# Patient Record
Sex: MALE | Race: WHITE | Employment: OTHER | URBAN - METROPOLITAN AREA
[De-identification: names, ages, dates, MRNs, and addresses within clinical notes are randomized per-mention and may not be internally consistent; named-entity substitution may affect disease eponyms.]

---

## 2017-01-09 ENCOUNTER — HOSPITAL ENCOUNTER (OUTPATIENT)
Dept: CT IMAGING | Facility: HOSPITAL | Age: 62
Discharge: HOME | End: 2017-01-09
Attending: INTERNAL MEDICINE

## 2017-01-09 DIAGNOSIS — Z13.9 SCREENING: ICD-10-CM

## 2017-03-10 ENCOUNTER — TELEPHONE (OUTPATIENT)
Dept: INTERNAL MEDICINE CLINIC | Facility: CLINIC | Age: 62
End: 2017-03-10

## 2017-03-10 NOTE — TELEPHONE ENCOUNTER
Please call patient. We received a copy of his rapid heart scan and the calcium score puts him in the moderate risk zone for developing heart disease. He may benefit from starting a cholesterol medication.  He can make an appt with me to discuss this furthe

## 2017-03-10 NOTE — TELEPHONE ENCOUNTER
Patient notified AMS received a copy of [facility] which puts pt at a moderate risk for developing heart disease. Scheduled appt for pt on 3/31/17 at 9am to discuss possibly starting a lowering cholesterol medication with AMS. Pt verbalizes understanding.  KEEGAN

## 2017-03-28 ENCOUNTER — APPOINTMENT (OUTPATIENT)
Dept: CT IMAGING | Facility: HOSPITAL | Age: 62
End: 2017-03-28
Attending: EMERGENCY MEDICINE
Payer: COMMERCIAL

## 2017-03-28 ENCOUNTER — HOSPITAL ENCOUNTER (EMERGENCY)
Facility: HOSPITAL | Age: 62
Discharge: HOME | End: 2017-03-28
Attending: EMERGENCY MEDICINE
Payer: COMMERCIAL

## 2017-03-28 ENCOUNTER — APPOINTMENT (OUTPATIENT)
Dept: GENERAL RADIOLOGY | Facility: HOSPITAL | Age: 62
End: 2017-03-28
Attending: EMERGENCY MEDICINE
Payer: COMMERCIAL

## 2017-03-28 VITALS
OXYGEN SATURATION: 95 % | SYSTOLIC BLOOD PRESSURE: 137 MMHG | WEIGHT: 155 LBS | HEART RATE: 54 BPM | DIASTOLIC BLOOD PRESSURE: 85 MMHG | RESPIRATION RATE: 14 BRPM | BODY MASS INDEX: 24.91 KG/M2 | TEMPERATURE: 98 F | HEIGHT: 66 IN

## 2017-03-28 DIAGNOSIS — H53.8 BLURRED VISION: Primary | ICD-10-CM

## 2017-03-28 DIAGNOSIS — F41.9 ANXIETY: ICD-10-CM

## 2017-03-28 LAB
ALBUMIN SERPL-MCNC: 4.4 G/DL (ref 3.5–4.8)
ALP LIVER SERPL-CCNC: 84 U/L (ref 45–117)
ALT SERPL-CCNC: 24 U/L (ref 17–63)
AST SERPL-CCNC: 28 U/L (ref 15–41)
ATRIAL RATE: 54 BPM
BASOPHILS # BLD AUTO: 0.03 X10(3) UL (ref 0–0.1)
BASOPHILS NFR BLD AUTO: 0.6 %
BILIRUB SERPL-MCNC: 0.4 MG/DL (ref 0.1–2)
BUN BLD-MCNC: 23 MG/DL (ref 8–20)
CALCIUM BLD-MCNC: 8.7 MG/DL (ref 8.3–10.3)
CHLORIDE: 105 MMOL/L (ref 101–111)
CO2: 26 MMOL/L (ref 22–32)
CREAT BLD-MCNC: 1.19 MG/DL (ref 0.7–1.3)
EOSINOPHIL # BLD AUTO: 0.05 X10(3) UL (ref 0–0.3)
EOSINOPHIL NFR BLD AUTO: 1 %
ERYTHROCYTE [DISTWIDTH] IN BLOOD BY AUTOMATED COUNT: 13.2 % (ref 11.5–16)
GLUCOSE BLD-MCNC: 80 MG/DL (ref 65–99)
GLUCOSE BLD-MCNC: 89 MG/DL (ref 70–99)
HCT VFR BLD AUTO: 41.9 % (ref 37–53)
HGB BLD-MCNC: 14.1 G/DL (ref 13–17)
IMMATURE GRANULOCYTE COUNT: 0.01 X10(3) UL (ref 0–1)
IMMATURE GRANULOCYTE RATIO %: 0.2 %
LYMPHOCYTES # BLD AUTO: 1.23 X10(3) UL (ref 0.9–4)
LYMPHOCYTES NFR BLD AUTO: 24.7 %
M PROTEIN MFR SERPL ELPH: 7.1 G/DL (ref 6.1–8.3)
MCH RBC QN AUTO: 31 PG (ref 27–33.2)
MCHC RBC AUTO-ENTMCNC: 33.7 G/DL (ref 31–37)
MCV RBC AUTO: 92.1 FL (ref 80–99)
MONOCYTES # BLD AUTO: 0.39 X10(3) UL (ref 0.1–0.6)
MONOCYTES NFR BLD AUTO: 7.8 %
NEUTROPHIL ABS PRELIM: 3.26 X10 (3) UL (ref 1.3–6.7)
NEUTROPHILS # BLD AUTO: 3.26 X10(3) UL (ref 1.3–6.7)
NEUTROPHILS NFR BLD AUTO: 65.7 %
P AXIS: 45 DEGREES
P-R INTERVAL: 182 MS
PLATELET # BLD AUTO: 184 10(3)UL (ref 150–450)
POTASSIUM SERPL-SCNC: 4 MMOL/L (ref 3.6–5.1)
Q-T INTERVAL: 430 MS
QRS DURATION: 94 MS
QTC CALCULATION (BEZET): 407 MS
R AXIS: -19 DEGREES
RBC # BLD AUTO: 4.55 X10(6)UL (ref 4.3–5.7)
RED CELL DISTRIBUTION WIDTH-SD: 44.5 FL (ref 35.1–46.3)
SODIUM SERPL-SCNC: 138 MMOL/L (ref 136–144)
T AXIS: -3 DEGREES
TROPONIN: <0.046 NG/ML (ref ?–0.05)
VENTRICULAR RATE: 54 BPM
WBC # BLD AUTO: 5 X10(3) UL (ref 4–13)

## 2017-03-28 PROCEDURE — 70450 CT HEAD/BRAIN W/O DYE: CPT

## 2017-03-28 PROCEDURE — 93010 ELECTROCARDIOGRAM REPORT: CPT

## 2017-03-28 PROCEDURE — 80053 COMPREHEN METABOLIC PANEL: CPT | Performed by: EMERGENCY MEDICINE

## 2017-03-28 PROCEDURE — 93005 ELECTROCARDIOGRAM TRACING: CPT

## 2017-03-28 PROCEDURE — 82962 GLUCOSE BLOOD TEST: CPT

## 2017-03-28 PROCEDURE — 84484 ASSAY OF TROPONIN QUANT: CPT | Performed by: EMERGENCY MEDICINE

## 2017-03-28 PROCEDURE — 99284 EMERGENCY DEPT VISIT MOD MDM: CPT

## 2017-03-28 PROCEDURE — 99285 EMERGENCY DEPT VISIT HI MDM: CPT

## 2017-03-28 PROCEDURE — 36415 COLL VENOUS BLD VENIPUNCTURE: CPT

## 2017-03-28 PROCEDURE — 85025 COMPLETE CBC W/AUTO DIFF WBC: CPT | Performed by: EMERGENCY MEDICINE

## 2017-03-28 PROCEDURE — 71010 XR CHEST AP PORTABLE  (CPT=71010): CPT

## 2017-03-28 NOTE — ED INITIAL ASSESSMENT (HPI)
Reports blurred vision for 5-10 minutes today, nauseated at that time. States all symptoms are improved now.  Denies dizziness

## 2017-03-28 NOTE — ED PROVIDER NOTES
Patient Seen in: [facility] Emergency Department    History   Patient presents with:   Eye Visual Problem (opthalmic)    Stated Complaint: blurred vision, nausea    HPI    71-year-old male that was brought to the emergency room after having about a 1 Packs/Day: 0.00  Years:           Types: Cigars    Comment: social smoker (cigars)     Alcohol Use: Yes           0.0 oz/week       0 Standard drinks or equivalent per week       Comment: a few glasses of wine a day       Review of Systems    Positive for DIFFERENTIAL[[id number]]                              Final result                 Please view results for these tests on the individual orders. CBC W/ DIFFERENTIAL      EKG    Rate, intervals and axes as noted on EKG Report.   Rate: 54  Rhythm: Sinus Rhyt PROCEDURE:  XR CHEST AP PORTABLE (CPT=71010)     TECHNIQUE:  AP chest radiograph was obtained.     COMPARISON:  [facility] , CHEST PA   LATERAL, 5/27/2011, 15:31.     INDICATIONS:  blurred vision, nausea     PATIENT STATED HISTORY: (As transcribed by Paolaa-Cola

## 2017-03-31 ENCOUNTER — OFFICE VISIT (OUTPATIENT)
Dept: INTERNAL MEDICINE CLINIC | Facility: CLINIC | Age: 62
End: 2017-03-31

## 2017-03-31 VITALS
RESPIRATION RATE: 16 BRPM | SYSTOLIC BLOOD PRESSURE: 106 MMHG | BODY MASS INDEX: 28.03 KG/M2 | HEART RATE: 60 BPM | HEIGHT: 64.5 IN | WEIGHT: 166.19 LBS | TEMPERATURE: 98 F | DIASTOLIC BLOOD PRESSURE: 68 MMHG

## 2017-03-31 DIAGNOSIS — Z82.49 FAMILY HISTORY OF HEART DISEASE: ICD-10-CM

## 2017-03-31 DIAGNOSIS — R93.1 ELEVATED CORONARY ARTERY CALCIUM SCORE: Primary | ICD-10-CM

## 2017-03-31 PROCEDURE — 99213 OFFICE O/P EST LOW 20 MIN: CPT | Performed by: FAMILY MEDICINE

## 2017-03-31 RX ORDER — PRAVASTATIN SODIUM 20 MG
20 TABLET ORAL NIGHTLY
Qty: 90 TABLET | Refills: 0 | Status: SHIPPED | OUTPATIENT
Start: 2017-03-31 | End: 2017-05-01

## 2017-03-31 NOTE — PROGRESS NOTES
HPI:    Patient ID: Karen Moreno is a 57year old male. HPI Here for f/u on CT calcium score. Put patient in moderate risk for heart disease. Has strong family history of heart disease. Rarely smokes cigars. Exercises regularly and takes baby ASA.  Shelley Fontanacal

## 2017-03-31 NOTE — PATIENT INSTRUCTIONS
Understanding Coronary Artery Disease (CAD)    To understand coronary artery disease (CAD), you need to know how your heart works. Your heart is a muscle that pumps blood throughout your body. To work right, your heart needs a steady supply of oxygen.  It © 7084-2954 [address], [address] [place]. All rights reserved. This information is not intended as a substitute for professional medical care. Always follow your healthcare professional's instructions.

## 2017-05-01 RX ORDER — PRAVASTATIN SODIUM 20 MG
20 TABLET ORAL NIGHTLY
Qty: 90 TABLET | Refills: 0 | Status: SHIPPED | OUTPATIENT
Start: 2017-05-01 | End: 2017-09-13

## 2017-05-01 NOTE — TELEPHONE ENCOUNTER
Last Office Visit: 3-31-17 with AS for follow up   Last Rx Filled: 3-31-17 90 tabs with no refills   Last Labs: 3-28-17 cbc/cmp  Future Appointment: none     Per protocol to provider

## 2017-07-17 ENCOUNTER — MED REC SCAN ONLY (OUTPATIENT)
Dept: INTERNAL MEDICINE CLINIC | Facility: CLINIC | Age: 62
End: 2017-07-17

## 2017-07-18 NOTE — TELEPHONE ENCOUNTER
Please call patient. We received a copy of his labs done through his wellness program for work. Everything looks fine.

## 2017-07-19 RX ORDER — ALPRAZOLAM 0.5 MG/1
0.5 TABLET ORAL 3 TIMES DAILY PRN
Qty: 10 TABLET | Refills: 0 | Status: SHIPPED | OUTPATIENT
Start: 2017-07-19 | End: 2018-07-10

## 2017-07-19 NOTE — TELEPHONE ENCOUNTER
Called pt to advise info per AMS that labs are WNL and look fine. Pt verbalized understanding. Pt also wants to notify AMS that he will be traveling and would like for some alprazolam [place]) 0.5 MG Oral Tab to be sent to pharmacy listed.  Pt states that h

## 2017-07-21 NOTE — TELEPHONE ENCOUNTER
Per AMS request- Rx was called into [facility] pharmacy s/w Larissa Bedoya. Pt was notified nothing further.

## 2017-09-13 RX ORDER — PRAVASTATIN SODIUM 20 MG
20 TABLET ORAL NIGHTLY
Qty: 90 TABLET | Refills: 1 | Status: SHIPPED | OUTPATIENT
Start: 2017-09-13 | End: 2018-05-10

## 2017-09-13 NOTE — TELEPHONE ENCOUNTER
Received fax from [employer] [facility] for refill of pravastatin  Medication(s) to Refill:   Pending Prescriptions Disp Refills    Pravastatin Sodium 20 MG Oral Tab 90 tablet 0     Sig: Take 1 tablet (20 mg total) by mouth nightly.            Last Time M 03/28/2017         Lab Results  Component Value Date   CHOLEST 210 (H) 11/01/2016   TRIG 92 11/01/2016   HDL 83 11/01/2016    11/01/2016   VLDL 18 11/01/2016   TCHDLRATIO 2.53 11/01/2016   [place] 127 11/01/2016

## 2017-11-14 ENCOUNTER — OFFICE VISIT (OUTPATIENT)
Dept: INTERNAL MEDICINE CLINIC | Facility: CLINIC | Age: 62
End: 2017-11-14

## 2017-11-14 VITALS
DIASTOLIC BLOOD PRESSURE: 60 MMHG | RESPIRATION RATE: 16 BRPM | WEIGHT: 162 LBS | SYSTOLIC BLOOD PRESSURE: 110 MMHG | TEMPERATURE: 98 F | BODY MASS INDEX: 26.03 KG/M2 | HEIGHT: 66 IN | HEART RATE: 68 BPM

## 2017-11-14 DIAGNOSIS — Z00.00 ANNUAL PHYSICAL EXAM: Primary | ICD-10-CM

## 2017-11-14 DIAGNOSIS — E78.5 HYPERLIPIDEMIA, UNSPECIFIED HYPERLIPIDEMIA TYPE: ICD-10-CM

## 2017-11-14 PROCEDURE — 99396 PREV VISIT EST AGE 40-64: CPT | Performed by: FAMILY MEDICINE

## 2017-11-14 PROCEDURE — 90471 IMMUNIZATION ADMIN: CPT | Performed by: FAMILY MEDICINE

## 2017-11-14 PROCEDURE — 90686 IIV4 VACC NO PRSV 0.5 ML IM: CPT | Performed by: FAMILY MEDICINE

## 2017-11-14 NOTE — PROGRESS NOTES
HPI:    Patient ID: Luci Dockery is a 60year old male. HPI Here for annual check-up. Patient has no complaints. Continues to exercise regularly. Notes he does probably eat too many carbohydrates. Up to date on colonoscopy.  Plans to retire in the ne swelling. Gastrointestinal: Negative for abdominal pain, constipation, diarrhea, nausea and vomiting. Endocrine: Negative for polydipsia, polyphagia and polyuria. Genitourinary: Negative for dysuria.    Musculoskeletal: Negative for arthralgias and angelique with patient. Reviewed lab results from June. Encouraged regular exercise and healthy eating. 2. Controlled on med. Continue.        Orders Placed This Encounter      Flulaval 0.5 ml >= 6 months Quad single dose Pres Free (71726)    Meds This Visit:  No p

## 2017-11-14 NOTE — PATIENT INSTRUCTIONS
Prevention Guidelines, Men Ages 48 to 59  Screening tests and vaccines are an important part of managing your health. Health counseling is essential, too. Below are guidelines for these, for men ages 48 to 59.  Talk with your healthcare provider to make s Prostate cancer Starting at age 39, talk to healthcare provider about risks and benefits of digital rectal exam (GO) and prostate-specific antigen (PSA) screening1 At routine exams   Syphilis Men at increased risk for infection – talk with your healthcare pertussis (Td/Tdap) booster All men in this age group Td every 10 years, or a one-time dose of Tdap instead of a Td booster after age 25, then Td every 8 years   Zoster All men ages 61 and older 1 dose   Counseling Who needs it How often   Diet and exerci

## 2018-05-10 RX ORDER — PRAVASTATIN SODIUM 20 MG
TABLET ORAL
Qty: 90 TABLET | Refills: 1 | Status: SHIPPED | OUTPATIENT
Start: 2018-05-10 | End: 2018-12-11

## 2018-05-10 NOTE — TELEPHONE ENCOUNTER
Requesting : pravastatin 20mg 1 QD  LOV: 11/14/17, AMS annual physical discussed cholesterol  RTC: 1 year for annual physical  Last Relevant Labs: 6/20/17 lipid panel (scanned in) - WNL  Filled: 9/13/17 #90 with 1 refills to mail order pharm    No future a

## 2018-07-10 ENCOUNTER — TELEPHONE (OUTPATIENT)
Dept: INTERNAL MEDICINE CLINIC | Facility: CLINIC | Age: 63
End: 2018-07-10

## 2018-07-10 RX ORDER — ALPRAZOLAM 0.5 MG/1
0.5 TABLET ORAL 3 TIMES DAILY PRN
Qty: 10 TABLET | Refills: 0 | Status: SHIPPED | OUTPATIENT
Start: 2018-07-10 | End: 2019-06-29

## 2018-07-10 NOTE — TELEPHONE ENCOUNTER
Rx printed. I will need to sign so you can fax. Please call patient to let him know once you fax it.

## 2018-07-10 NOTE — TELEPHONE ENCOUNTER
Pt will be traveling on 7/21/18 and would like to know if AMS will order   ALPRAZolam 0.5 MG Oral Tab 10 tablet 0 7/19/2017     Sig - Route:  Take 1 tablet (0.5 mg total) by mouth 3 (three) times daily as needed for Anxiety. - Oral      Please advise     Se

## 2018-07-10 NOTE — TELEPHONE ENCOUNTER
LOV: 11/14/17 AMS  Future office visit: 11/19/18 AMS  Last labs: 6/20/17 Cmp Cbc Lipid -Scanned in   Last RX: Alprazolam 7/1917 #10 No Refills  Per protocol: Route to provider       Please advise on telephone encounter w/ refill.

## 2018-09-21 ENCOUNTER — TELEPHONE (OUTPATIENT)
Dept: INTERNAL MEDICINE CLINIC | Facility: CLINIC | Age: 63
End: 2018-09-21

## 2018-09-21 DIAGNOSIS — R74.8 ELEVATED LIVER ENZYMES: Primary | ICD-10-CM

## 2018-09-21 NOTE — TELEPHONE ENCOUNTER
Please call patient. We received his lab results done through his employer. Everything looks good except a couple of his liver tests are off. Not drastic but just something we need to repeat in about 3 months. Non-fasting order is in.  He has physical with

## 2018-09-24 ENCOUNTER — TELEPHONE (OUTPATIENT)
Dept: INTERNAL MEDICINE CLINIC | Facility: CLINIC | Age: 63
End: 2018-09-24

## 2018-09-24 NOTE — TELEPHONE ENCOUNTER
Called pt back. Spoke to pt, reviewed results and POC per AMS. Pt verbalized understanding.  Advised that labs have been ordered and that he does not need to fast.  Also recommended pt have labs done prior to upcoming CPE with AMS so those results can be d

## 2018-11-02 ENCOUNTER — APPOINTMENT (OUTPATIENT)
Dept: LAB | Facility: HOSPITAL | Age: 63
End: 2018-11-02
Attending: FAMILY MEDICINE
Payer: COMMERCIAL

## 2018-11-02 DIAGNOSIS — R74.8 ELEVATED LIVER ENZYMES: ICD-10-CM

## 2018-11-02 PROCEDURE — 36415 COLL VENOUS BLD VENIPUNCTURE: CPT

## 2018-11-02 PROCEDURE — 80053 COMPREHEN METABOLIC PANEL: CPT

## 2018-11-19 ENCOUNTER — OFFICE VISIT (OUTPATIENT)
Dept: INTERNAL MEDICINE CLINIC | Facility: CLINIC | Age: 63
End: 2018-11-19
Payer: COMMERCIAL

## 2018-11-19 VITALS
HEART RATE: 70 BPM | BODY MASS INDEX: 26 KG/M2 | TEMPERATURE: 98 F | WEIGHT: 164 LBS | DIASTOLIC BLOOD PRESSURE: 72 MMHG | SYSTOLIC BLOOD PRESSURE: 120 MMHG | RESPIRATION RATE: 16 BRPM

## 2018-11-19 DIAGNOSIS — G89.29 CHRONIC LEFT SHOULDER PAIN: ICD-10-CM

## 2018-11-19 DIAGNOSIS — M25.512 CHRONIC LEFT SHOULDER PAIN: ICD-10-CM

## 2018-11-19 DIAGNOSIS — Z11.59 NEED FOR HEPATITIS C SCREENING TEST: ICD-10-CM

## 2018-11-19 DIAGNOSIS — Z00.00 ANNUAL PHYSICAL EXAM: Primary | ICD-10-CM

## 2018-11-19 DIAGNOSIS — E78.00 PURE HYPERCHOLESTEROLEMIA: ICD-10-CM

## 2018-11-19 PROCEDURE — 99396 PREV VISIT EST AGE 40-64: CPT | Performed by: FAMILY MEDICINE

## 2018-11-21 NOTE — PROGRESS NOTES
HPI:    Patient ID: Merline Busman is a 58year old male. HPI Here for annual check-up. Patient c/o chronic left shoulder pain. Has had right shoulder surgery a few years ago and was told he likely will need left shoulder. Normal ROM. No clear injury. Problem Relation Age of Onset   • Heart Attack Father         MI   • Heart Disease Father    • Hypertension Paternal Grandfather    • Heart Disease Paternal Grandfather    • Cancer Paternal Grandmother         Pancreas   • Cancer Maternal Grandfather moist and mucous membranes are normal. No posterior oropharyngeal erythema. Eyes: Conjunctivae are normal.   Neck: Normal range of motion. Neck supple. Cardiovascular: Normal rate, regular rhythm and normal heart sounds.    Pulmonary/Chest: Effort shantel

## 2018-11-30 ENCOUNTER — APPOINTMENT (OUTPATIENT)
Dept: LAB | Facility: HOSPITAL | Age: 63
End: 2018-11-30
Attending: FAMILY MEDICINE
Payer: COMMERCIAL

## 2018-11-30 DIAGNOSIS — Z11.59 NEED FOR HEPATITIS C SCREENING TEST: ICD-10-CM

## 2018-11-30 PROCEDURE — 86803 HEPATITIS C AB TEST: CPT

## 2018-11-30 PROCEDURE — 36415 COLL VENOUS BLD VENIPUNCTURE: CPT

## 2018-12-11 RX ORDER — PRAVASTATIN SODIUM 20 MG
TABLET ORAL
Qty: 90 TABLET | Refills: 2 | Status: SHIPPED | OUTPATIENT
Start: 2018-12-11 | End: 2019-10-03

## 2018-12-11 NOTE — TELEPHONE ENCOUNTER
E request  LOV: 11/19/18- annual px  Last labs: 11/30/18- no lipid  Last rx: 5/10/18- 90 tablets with 1 refill  Per protocol to provider

## 2019-02-08 ENCOUNTER — TELEPHONE (OUTPATIENT)
Dept: INTERNAL MEDICINE CLINIC | Facility: CLINIC | Age: 64
End: 2019-02-08

## 2019-02-08 NOTE — TELEPHONE ENCOUNTER
Spoke with patient. Patient was given information below from AMS and Pre-op scheduled.     Future Appointments   Date Time Provider Elisha Montoya   2/15/2019  1:00 PM Mode Ruth DO [facility]

## 2019-02-08 NOTE — TELEPHONE ENCOUNTER
Please call patient to schedule pre-op appt. The paperwork we received states that they informed the patient he will need to call their pre-admissions testing dept ([phone]) to arrange for any pre-op testing needed. He needs to do this and have any testing needed done prior to seeing me.

## 2019-02-15 ENCOUNTER — OFFICE VISIT (OUTPATIENT)
Dept: INTERNAL MEDICINE CLINIC | Facility: CLINIC | Age: 64
End: 2019-02-15
Payer: COMMERCIAL

## 2019-02-15 ENCOUNTER — TELEPHONE (OUTPATIENT)
Dept: INTERNAL MEDICINE CLINIC | Facility: CLINIC | Age: 64
End: 2019-02-15

## 2019-02-15 VITALS
TEMPERATURE: 98 F | OXYGEN SATURATION: 99 % | DIASTOLIC BLOOD PRESSURE: 78 MMHG | WEIGHT: 162 LBS | HEART RATE: 59 BPM | SYSTOLIC BLOOD PRESSURE: 124 MMHG | HEIGHT: 64.25 IN | BODY MASS INDEX: 27.66 KG/M2

## 2019-02-15 DIAGNOSIS — Z01.818 PREOPERATIVE EXAMINATION: Primary | ICD-10-CM

## 2019-02-15 DIAGNOSIS — M75.102 TEAR OF LEFT ROTATOR CUFF, UNSPECIFIED TEAR EXTENT: ICD-10-CM

## 2019-02-15 PROBLEM — M75.122 COMPLETE TEAR OF LEFT ROTATOR CUFF: Status: ACTIVE | Noted: 2019-02-05

## 2019-02-15 PROBLEM — M19.019 OSTEOARTHRITIS OF ACROMIOCLAVICULAR JOINT: Status: ACTIVE | Noted: 2019-02-05

## 2019-02-15 PROBLEM — M75.42 ROTATOR CUFF IMPINGEMENT SYNDROME OF LEFT SHOULDER: Status: ACTIVE | Noted: 2018-12-17

## 2019-02-15 PROCEDURE — 99214 OFFICE O/P EST MOD 30 MIN: CPT | Performed by: FAMILY MEDICINE

## 2019-02-15 NOTE — PROGRESS NOTES
HPI:    Patient ID: Anthony Song is a 58year old male.     HPI Here for preoperative exam for planned left shoulder arthroscopic rotator cuff repair, subacromial decompression, possible distal clavicle excision and debridement with Dr. Shanice Ruiz schedule diarrhea, nausea and vomiting. Endocrine: Negative for polydipsia, polyphagia and polyuria. Genitourinary: Negative for dysuria. Musculoskeletal: Negative for arthralgias and joint swelling. Skin: Negative for rash.    Neurological: Negative for diz orders of the defined types were placed in this encounter.       Meds This Visit:  Requested Prescriptions      No prescriptions requested or ordered in this encounter       Imaging & Referrals:  None       #[phone]

## 2019-02-17 ENCOUNTER — PATIENT MESSAGE (OUTPATIENT)
Dept: INTERNAL MEDICINE CLINIC | Facility: CLINIC | Age: 64
End: 2019-02-17

## 2019-02-18 NOTE — TELEPHONE ENCOUNTER
From: Katie Schwartz  To: Levon Rossi DO  Sent: 2/17/2019 7:23 PM CST  Subject: Prescription Question    Can I take Alprazolam .5 MG on a \"as needed only \" basis prior to my 2/27 shoulder surgery? Flying/ traveling next week. Thank you.  Ric Myles

## 2019-02-18 NOTE — TELEPHONE ENCOUNTER
AMS- ok for pt to take 1 pill daily when flying (he will take one pill on going/returning flight) if he has surgery scheduled for 2/27? Please advise, thank you.     ALPRAZolam 0.5 MG Oral Tab Take 1 tablet (0.5 mg total) by mouth 3 (three) times daily as n

## 2019-07-01 NOTE — TELEPHONE ENCOUNTER
Last Office Visit: 2-15-19 for pre op  Last Rx Filled: 7-10-18 10 tabs with no refills   Last Labs: 11-30-18 hcv antibody.  11-2-18 cmp  Future Appointment: none    Per protocol to provider     Please see mychart message from patient

## 2019-07-01 NOTE — TELEPHONE ENCOUNTER
Please phone in Rx and then place. Message patient once you've done this and also note with altitude sickness the best thing is to not keep climbing.  He should make sure to stay well-hydrated to try to prevent this and is he gets symptoms and they do not g

## 2019-07-01 NOTE — TELEPHONE ENCOUNTER
Called pharmacy and provided verbal rx for Alprazolam 0.5 mg tabs; 1 tab daily PRN for anxiety; #15 with 0 refills under AMS. Routed back to AMS to sign. Replied to pt's [employer] message with AMS instructions.

## 2019-07-02 RX ORDER — ALPRAZOLAM 0.5 MG/1
0.5 TABLET ORAL
Qty: 15 TABLET | Refills: 0 | OUTPATIENT
Start: 2019-07-02 | End: 2019-08-31

## 2019-07-02 NOTE — TELEPHONE ENCOUNTER
He's asking for dexamethasone or nifedipine for altitude hiking. Just let him know that these are medications that are used to prevent/treat a specific, very serious form of altitude sickness and not for typical high altitude hikes.  Therefore, not somethin

## 2019-08-13 ENCOUNTER — TELEPHONE (OUTPATIENT)
Dept: INTERNAL MEDICINE CLINIC | Facility: CLINIC | Age: 64
End: 2019-08-13

## 2019-08-13 DIAGNOSIS — Z12.5 ENCOUNTER FOR SCREENING FOR MALIGNANT NEOPLASM OF PROSTATE: Primary | ICD-10-CM

## 2019-08-13 NOTE — TELEPHONE ENCOUNTER
Future Appointments   Date Time Provider Elisha Montoya   11/22/2019  8:30 AM Yenni Stark, DO [facility]     Orders to [facility]- Pt aware to fast-no call back required

## 2019-09-03 ENCOUNTER — PATIENT MESSAGE (OUTPATIENT)
Dept: INTERNAL MEDICINE CLINIC | Facility: CLINIC | Age: 64
End: 2019-09-03

## 2019-09-03 RX ORDER — ALPRAZOLAM 0.5 MG/1
0.5 TABLET ORAL
Qty: 15 TABLET | Refills: 0 | Status: SHIPPED | OUTPATIENT
Start: 2019-09-03 | End: 2020-03-19

## 2019-09-03 RX ORDER — ALPRAZOLAM 0.5 MG/1
0.5 TABLET ORAL
Qty: 15 TABLET | Refills: 0 | OUTPATIENT
Start: 2019-09-03

## 2019-09-03 NOTE — TELEPHONE ENCOUNTER
Pt will be going out of town today at 1:00 for a week and is asking that it be filled before then pls.

## 2019-09-03 NOTE — TELEPHONE ENCOUNTER
Alprazolam 0.5mg was sent to [facility] on [address]    Confirmation received   Closing encounter

## 2019-09-03 NOTE — TELEPHONE ENCOUNTER
Last Office Visit: 2-15-19 for pre op  Last Rx Filled: 7-2-19 15 tabs with no refills   Last Labs: 11-30-18 hcv antibody/cmp  Future Appointment: 11-22-19    Per protocol to provider

## 2019-09-10 LAB
A/G RATIO: 2.5
ALBUMIN: 4.8 G/DL
ALKALINE PHOSPHATASE: 61
ALT (SGPT): 20 IU/L
AMB EXT CHOL/HDL RATIO: 2.1
AMB EXT CHOLESTEROL, TOTAL: 157 MG/DL
AMB EXT HDL CHOLESTEROL: 76 MG/DL
AMB EXT LDL CHOLESTEROL, DIRECT: 69 MG/DL
AMB EXT TRIGLYCERIDES: 62 MG/DL
AMB EXT VLDL: 12 MG/DL
AST (SGOT): 36 IU/L
BILIRUBIN, DIRECT: 0.14 MG/DL
BILIRUBIN, TOTAL: 0.4
BUN/CREATININE RATIO: 22
BUN: 20
CALCIUM: 9.1
CARBON DIOXIDE: 21
CHLORIDE: 103
CREATININE: 0.93 MG/DL
EGFR IF NONAFRICN AM: 86
GGT: 33 IU/L
GLOBULIN, TOTAL: 1.9 G/DL
GLUCOSE: 94
HCT: 42.2
HGB: 14.2
IRON: 90
LDH: 230 IU/L
MEAN CELL VOLUME: 90
MEAN CORPUSCULAR HEMOGLOBIN: 30.3
MEAN CORPUSCULAR HGB CONC: 33.6
PHOSPHORUS: 3
PLT: 216
POTASSIUM: 4.2
RED BLOOD COUNT: 4.68
RED CELL DISTRIBUTION WIDTH: 13.6
SODIUM: 138
TOTAL PROTEIN: 6.7
TSH: 1.5 UIU/ML
URIC ACID: 6.5
WBC: 5.1

## 2019-09-13 ENCOUNTER — TELEPHONE (OUTPATIENT)
Dept: INTERNAL MEDICINE CLINIC | Facility: CLINIC | Age: 64
End: 2019-09-13

## 2019-09-13 NOTE — TELEPHONE ENCOUNTER
Received lab results from [facility] ordered by pt's employer for wellness program.  Abstracted and placed in AMS bin for review. Once reviewed, will send to scanning.

## 2019-09-13 NOTE — TELEPHONE ENCOUNTER
I don't see Lipid panel results listed. If that hasn't been done (I can't recall from when I looked over his labs) he can do lipids. Also if he wants PSA done we can order that.

## 2019-09-13 NOTE — TELEPHONE ENCOUNTER
Pt had labs done through employer on 9/11/19 - Uric acid, CMP, Iron, Lipid panel, TSH, CBC. Will these count towards CPE labs? Please advise, thank you.

## 2019-09-17 NOTE — TELEPHONE ENCOUNTER
Called pt back. Pt does want to have PSA checked, pended to AMS. Pt stated he would come in a few weeks and also get his flu shot at that time.   Pt asked about Shingrix, advised pt to look into his insurance to see if he needs to have done at doctor's of

## 2019-09-26 ENCOUNTER — TELEPHONE (OUTPATIENT)
Dept: INTERNAL MEDICINE CLINIC | Facility: CLINIC | Age: 64
End: 2019-09-26

## 2019-09-26 NOTE — TELEPHONE ENCOUNTER
Future Appointments   Date Time Provider Elisha Montoya   9/27/2019  9:45 AM [facility]   11/22/2019  8:30 AM Maeve Hammond, DO [facility]     Pt coming tomorrow for flu and pneumonia injection-please place order for p

## 2019-09-27 ENCOUNTER — APPOINTMENT (OUTPATIENT)
Dept: LAB | Age: 64
End: 2019-09-27
Attending: FAMILY MEDICINE
Payer: COMMERCIAL

## 2019-09-27 ENCOUNTER — MED REC SCAN ONLY (OUTPATIENT)
Dept: INTERNAL MEDICINE CLINIC | Facility: CLINIC | Age: 64
End: 2019-09-27

## 2019-09-27 ENCOUNTER — NURSE ONLY (OUTPATIENT)
Dept: INTERNAL MEDICINE CLINIC | Facility: CLINIC | Age: 64
End: 2019-09-27
Payer: COMMERCIAL

## 2019-09-27 DIAGNOSIS — Z12.5 ENCOUNTER FOR SCREENING FOR MALIGNANT NEOPLASM OF PROSTATE: ICD-10-CM

## 2019-09-27 LAB — COMPLEXED PSA SERPL-MCNC: 1.67 NG/ML (ref ?–4)

## 2019-09-27 PROCEDURE — 90472 IMMUNIZATION ADMIN EACH ADD: CPT | Performed by: FAMILY MEDICINE

## 2019-09-27 PROCEDURE — 90662 IIV NO PRSV INCREASED AG IM: CPT | Performed by: FAMILY MEDICINE

## 2019-09-27 PROCEDURE — 84153 ASSAY OF PSA TOTAL: CPT | Performed by: FAMILY MEDICINE

## 2019-09-27 PROCEDURE — 90670 PCV13 VACCINE IM: CPT | Performed by: FAMILY MEDICINE

## 2019-09-27 PROCEDURE — 90471 IMMUNIZATION ADMIN: CPT | Performed by: FAMILY MEDICINE

## 2019-10-03 RX ORDER — PRAVASTATIN SODIUM 20 MG
TABLET ORAL
Qty: 90 TABLET | Refills: 0 | Status: SHIPPED | OUTPATIENT
Start: 2019-10-03 | End: 2019-12-23

## 2019-10-03 NOTE — TELEPHONE ENCOUNTER
Last Office Visit 11/19/18    Last CPE 11/19/18    Last refill 12/11/18-Pravastatin Sodium 20mg    Quantity 90 tablet, 2 refills    Last labs that are related  9/10/19-lipid, cmp, cbc    Future appointment   Future Appointments   Date Time Provider Departm

## 2019-11-22 ENCOUNTER — OFFICE VISIT (OUTPATIENT)
Dept: INTERNAL MEDICINE CLINIC | Facility: CLINIC | Age: 64
End: 2019-11-22
Payer: COMMERCIAL

## 2019-11-22 VITALS
WEIGHT: 159 LBS | DIASTOLIC BLOOD PRESSURE: 82 MMHG | SYSTOLIC BLOOD PRESSURE: 132 MMHG | HEART RATE: 56 BPM | HEIGHT: 64.49 IN | BODY MASS INDEX: 26.81 KG/M2 | RESPIRATION RATE: 16 BRPM | TEMPERATURE: 98 F

## 2019-11-22 DIAGNOSIS — E78.00 PURE HYPERCHOLESTEROLEMIA: ICD-10-CM

## 2019-11-22 DIAGNOSIS — E04.1 THYROID NODULE: ICD-10-CM

## 2019-11-22 DIAGNOSIS — Z00.00 ANNUAL PHYSICAL EXAM: Primary | ICD-10-CM

## 2019-11-22 PROBLEM — M75.42 ROTATOR CUFF IMPINGEMENT SYNDROME OF LEFT SHOULDER: Status: RESOLVED | Noted: 2018-12-17 | Resolved: 2019-11-22

## 2019-11-22 PROBLEM — M19.019 OSTEOARTHRITIS OF ACROMIOCLAVICULAR JOINT: Status: RESOLVED | Noted: 2019-02-05 | Resolved: 2019-11-22

## 2019-11-22 PROBLEM — M75.122 COMPLETE TEAR OF LEFT ROTATOR CUFF: Status: RESOLVED | Noted: 2019-02-05 | Resolved: 2019-11-22

## 2019-11-22 PROCEDURE — 99397 PER PM REEVAL EST PAT 65+ YR: CPT | Performed by: FAMILY MEDICINE

## 2019-11-22 NOTE — PROGRESS NOTES
HPI:    Patient ID: Katie Schwartz is a 71year old male. HPI Here for annual check-up. Patient continues to eat healthy and run for exercise. Feels great. Taking meds as prescribed with no issues.  Continues to rarely need Xanax and this helps when joint swelling. Skin: Negative for rash. Neurological: Negative for dizziness, weakness, light-headedness, numbness and headaches. Hematological: Negative for adenopathy. Does not bruise/bleed easily.    Psychiatric/Behavioral: Negative for dysphoric first.     No orders of the defined types were placed in this encounter.       Meds This Visit:  Requested Prescriptions      No prescriptions requested or ordered in this encounter       Imaging & Referrals:  None       PW#[id number]

## 2019-11-22 NOTE — PATIENT INSTRUCTIONS
Prevention Guidelines, Men Ages 72 and Older  Screening tests and vaccines are an important part of managing your health. A screening test is done to find possible disorders or diseases in people who don't have any symptoms.  The goal is to find a disease infection – talk with your healthcare provider At routine exams   High cholesterol or triglycerides All men in this age group At least every 5 years   HIV Men at increased risk for infection – talk with your healthcare provider At routine exams   Lung canc pneumococcal polysaccharide vaccine (PPSV23) All men in this age group 1 dose of each vaccine   Tetanus/diphtheria/  pertussis (Td/Tdap) booster All men in this age group Td every 10 years, or Tdap if you will have contact with a child younger than 13 [address]

## 2019-12-23 RX ORDER — PRAVASTATIN SODIUM 20 MG
TABLET ORAL
Qty: 90 TABLET | Refills: 2 | Status: SHIPPED | OUTPATIENT
Start: 2019-12-23 | End: 2020-03-19

## 2019-12-23 NOTE — TELEPHONE ENCOUNTER
LOV:11/22/19  Last CPE:11/22/19  Last refill:PRAVASTATIN SODIUM 20 MG Oral Tab 10/3/19  Quantity: 90 tablet, 0 refills  Last labs that are related: lipid 9/10/19  Future appointment: none  Protocol: pend    Please approve or deny

## 2020-03-12 ENCOUNTER — OFFICE VISIT (OUTPATIENT)
Dept: INTERNAL MEDICINE CLINIC | Facility: CLINIC | Age: 65
End: 2020-03-12
Payer: MEDICARE

## 2020-03-12 VITALS
TEMPERATURE: 98 F | HEART RATE: 60 BPM | WEIGHT: 159 LBS | SYSTOLIC BLOOD PRESSURE: 138 MMHG | BODY MASS INDEX: 26.81 KG/M2 | RESPIRATION RATE: 16 BRPM | HEIGHT: 64.49 IN | DIASTOLIC BLOOD PRESSURE: 76 MMHG

## 2020-03-12 DIAGNOSIS — R09.82 POST-NASAL DRIP: ICD-10-CM

## 2020-03-12 DIAGNOSIS — R12 HEARTBURN: Primary | ICD-10-CM

## 2020-03-12 DIAGNOSIS — J02.9 ACUTE PHARYNGITIS, UNSPECIFIED ETIOLOGY: ICD-10-CM

## 2020-03-12 PROCEDURE — 99213 OFFICE O/P EST LOW 20 MIN: CPT | Performed by: NURSE PRACTITIONER

## 2020-03-12 NOTE — PROGRESS NOTES
HPI:    Patient ID: Silverio Pedraza is a 71year old male. Patient presents with c/o an occasional non-productive cough for the past 2-3 months with associate throat clearing.   Denies any associated fevers, chills, body aches, shortness of breath, whee Review of Systems   Constitutional: Negative for activity change, appetite change, chills, fatigue, fever and unexpected weight change.    HENT: Positive for hearing loss (Decreased hearing in the left ear), postnasal drip, sore throat (Resolved), t Eyes: Conjunctivae are normal.   Cardiovascular: Normal rate, regular rhythm and normal heart sounds. Pulmonary/Chest: Effort normal and breath sounds normal.   Abdominal: Soft. Normal appearance and bowel sounds are normal. He exhibits no mass.  There

## 2020-03-12 NOTE — PATIENT INSTRUCTIONS
Continue to take over-the-counter Flonase (1-2 sprays per nostril once a day) for the next 1-2 weeks in addition to an over-the-counter antihistamine. Start taking over-the-counter Pepcid as directed on the packaging for the next 2-weeks.   Consider modify especially at night. Frequent, smaller meals are best. Don't lie down right after eating. And don’t eat anything 3 hours before going to bed. · Don't drink alcohol or smoke. As much as possible, stay away from second hand smoke.   · If you are overweight, color)  · Feeling weak or dizzy  · Fever of 100.4ºF (38ºC) or higher, or as directed by your healthcare provider  Date Last Reviewed: 3/1/2018  © 9542-0993 [employer]. [address] [facility], [address] [place] [address]. All rights reserved.  This info degrees F). Do not freeze. Throw away any unused medicine after the expiration date. What should I tell my health care provider before I take this medicine?   They need to know if you have any of these conditions:  · kidney or liver disease  · trouble swal

## 2020-03-19 RX ORDER — PRAVASTATIN SODIUM 20 MG
20 TABLET ORAL NIGHTLY
Qty: 90 TABLET | Refills: 1 | Status: SHIPPED | OUTPATIENT
Start: 2020-03-19 | End: 2020-09-21

## 2020-03-19 RX ORDER — ALPRAZOLAM 0.5 MG/1
0.5 TABLET ORAL
Qty: 30 TABLET | Refills: 0 | Status: SHIPPED | OUTPATIENT
Start: 2020-03-19

## 2020-03-19 NOTE — TELEPHONE ENCOUNTER
I called and spoke with patient because of documented symptoms for wanting Alprazolam. Patient runs 6 miles daily and has no chest pain or shortness of breath with running.  Today he finished his run, took a shower as normal. After this time he started to f

## 2020-03-19 NOTE — TELEPHONE ENCOUNTER
Spoke to patient, asked him why he wanted additional alprazolam tablets. Pt states after he completed his run he felt fluttering in his chest/feeling anxious. Denies chest pain. Pt states LOV 3/12/20-he was c/o acid reflux heartburn.  Informed patient medic

## 2020-03-19 NOTE — TELEPHONE ENCOUNTER
LVMTCB-pt requesting increased use on alprazolam according to notes.  Would like to know why increased use on alprazolam.    Pt would also like Pravastatin at 30 pills and not 90    LOV:11/22/19  Last CPE:11/22/19  Last refill:  ALPRAZolam 0.5 MG-9/3/19 #15

## 2020-03-23 ENCOUNTER — PATIENT MESSAGE (OUTPATIENT)
Dept: INTERNAL MEDICINE CLINIC | Facility: CLINIC | Age: 65
End: 2020-03-23

## 2020-03-23 ENCOUNTER — APPOINTMENT (OUTPATIENT)
Dept: GENERAL RADIOLOGY | Facility: HOSPITAL | Age: 65
End: 2020-03-23
Attending: EMERGENCY MEDICINE
Payer: MEDICARE

## 2020-03-23 ENCOUNTER — HOSPITAL ENCOUNTER (EMERGENCY)
Facility: HOSPITAL | Age: 65
Discharge: HOME | End: 2020-03-23
Attending: EMERGENCY MEDICINE
Payer: MEDICARE

## 2020-03-23 VITALS
OXYGEN SATURATION: 99 % | TEMPERATURE: 98 F | RESPIRATION RATE: 11 BRPM | SYSTOLIC BLOOD PRESSURE: 142 MMHG | HEART RATE: 65 BPM | DIASTOLIC BLOOD PRESSURE: 82 MMHG

## 2020-03-23 DIAGNOSIS — R07.9 ACUTE CHEST PAIN: Primary | ICD-10-CM

## 2020-03-23 LAB
ALBUMIN SERPL-MCNC: 4.1 G/DL (ref 3.4–5)
ALBUMIN/GLOB SERPL: 1.1 {RATIO} (ref 1–2)
ALP LIVER SERPL-CCNC: 98 U/L (ref 45–117)
ALT SERPL-CCNC: 24 U/L (ref 16–61)
ANION GAP SERPL CALC-SCNC: 5 MMOL/L (ref 0–18)
AST SERPL-CCNC: 38 U/L (ref 15–37)
BASOPHILS # BLD AUTO: 0.05 X10(3) UL (ref 0–0.2)
BASOPHILS NFR BLD AUTO: 0.6 %
BILIRUB SERPL-MCNC: 0.3 MG/DL (ref 0.1–2)
BUN BLD-MCNC: 23 MG/DL (ref 7–18)
BUN/CREAT SERPL: 19.2 (ref 10–20)
CALCIUM BLD-MCNC: 9.2 MG/DL (ref 8.5–10.1)
CHLORIDE SERPL-SCNC: 104 MMOL/L (ref 98–112)
CO2 SERPL-SCNC: 29 MMOL/L (ref 21–32)
CREAT BLD-MCNC: 1.2 MG/DL (ref 0.7–1.3)
DEPRECATED RDW RBC AUTO: 42.2 FL (ref 35.1–46.3)
EOSINOPHIL # BLD AUTO: 0.08 X10(3) UL (ref 0–0.7)
EOSINOPHIL NFR BLD AUTO: 0.9 %
ERYTHROCYTE [DISTWIDTH] IN BLOOD BY AUTOMATED COUNT: 12.6 % (ref 11–15)
GLOBULIN PLAS-MCNC: 3.7 G/DL (ref 2.8–4.4)
GLUCOSE BLD-MCNC: 116 MG/DL (ref 70–99)
HCT VFR BLD AUTO: 45.3 % (ref 39–53)
HGB BLD-MCNC: 15.1 G/DL (ref 13–17.5)
IMM GRANULOCYTES # BLD AUTO: 0.01 X10(3) UL (ref 0–1)
IMM GRANULOCYTES NFR BLD: 0.1 %
LYMPHOCYTES # BLD AUTO: 2.67 X10(3) UL (ref 1–4)
LYMPHOCYTES NFR BLD AUTO: 30.5 %
M PROTEIN MFR SERPL ELPH: 7.8 G/DL (ref 6.4–8.2)
MCH RBC QN AUTO: 30.6 PG (ref 26–34)
MCHC RBC AUTO-ENTMCNC: 33.3 G/DL (ref 31–37)
MCV RBC AUTO: 91.7 FL (ref 80–100)
MONOCYTES # BLD AUTO: 0.74 X10(3) UL (ref 0.1–1)
MONOCYTES NFR BLD AUTO: 8.5 %
NEUTROPHILS # BLD AUTO: 5.19 X10 (3) UL (ref 1.5–7.7)
NEUTROPHILS # BLD AUTO: 5.19 X10(3) UL (ref 1.5–7.7)
NEUTROPHILS NFR BLD AUTO: 59.4 %
OSMOLALITY SERPL CALC.SUM OF ELEC: 291 MOSM/KG (ref 275–295)
PLATELET # BLD AUTO: 229 10(3)UL (ref 150–450)
POTASSIUM SERPL-SCNC: 3.9 MMOL/L (ref 3.5–5.1)
RBC # BLD AUTO: 4.94 X10(6)UL (ref 3.8–5.8)
SODIUM SERPL-SCNC: 138 MMOL/L (ref 136–145)
TROPONIN I SERPL-MCNC: <0.045 NG/ML (ref ?–0.04)
WBC # BLD AUTO: 8.7 X10(3) UL (ref 4–11)

## 2020-03-23 PROCEDURE — 99284 EMERGENCY DEPT VISIT MOD MDM: CPT

## 2020-03-23 PROCEDURE — 80053 COMPREHEN METABOLIC PANEL: CPT | Performed by: EMERGENCY MEDICINE

## 2020-03-23 PROCEDURE — 93010 ELECTROCARDIOGRAM REPORT: CPT

## 2020-03-23 PROCEDURE — 85025 COMPLETE CBC W/AUTO DIFF WBC: CPT | Performed by: EMERGENCY MEDICINE

## 2020-03-23 PROCEDURE — 84484 ASSAY OF TROPONIN QUANT: CPT | Performed by: EMERGENCY MEDICINE

## 2020-03-23 PROCEDURE — 99285 EMERGENCY DEPT VISIT HI MDM: CPT

## 2020-03-23 PROCEDURE — 93005 ELECTROCARDIOGRAM TRACING: CPT

## 2020-03-23 PROCEDURE — 36415 COLL VENOUS BLD VENIPUNCTURE: CPT

## 2020-03-23 PROCEDURE — 71045 X-RAY EXAM CHEST 1 VIEW: CPT | Performed by: EMERGENCY MEDICINE

## 2020-03-23 RX ORDER — ASPIRIN 81 MG/1
324 TABLET, CHEWABLE ORAL ONCE
Status: COMPLETED | OUTPATIENT
Start: 2020-03-23 | End: 2020-03-23

## 2020-03-23 NOTE — ED PROVIDER NOTES
Patient Seen in: [facility] Emergency Department      History   Patient presents with:  Chest Pain Angina    Stated Complaint:     HPI    I reviewed a telephone note from today.   Patient called his [de-identified] nurse stating that for a month he has b comment: social smoker (cigars)     Alcohol use: Yes      Alcohol/week: 0.0 standard drinks      Comment: a few glasses of wine a day     Drug use: No             Review of Systems    Positive for stated complaint:   Other systems are as noted in HPI.   Con ------                     CBC W/ DIFFERENTIAL[[id number]]                              Final result                 Please view results for these tests on the individual orders.    RAINBOW DRAW BLUE   RAINBOW DRAW LAVENDER   RAINBOW DRAW LIGHT GREEN   R Sue Hermosillo  [id number]    Call in 1 day          Medications Prescribed:  Current Discharge Medication List

## 2020-03-23 NOTE — TELEPHONE ENCOUNTER
From: Gilbert Sandoval  To: Ranjan Gtz DO  Sent: 3/23/2020 10:14 AM CDT  Subject: Non-Urgent Medical Question    This is a following to our conversation last Thursday 3/19 regarding my anxiety/heart quiver concern.  I resumed my routine running 6 miles

## 2020-03-23 NOTE — ED INITIAL ASSESSMENT (HPI)
Chest pain started this am; pt states pain has not gone away completely so he came to ED for further evaluation. Pt states he has had a little shortness of breath with warmth to the L side of his body.

## 2020-03-24 ENCOUNTER — PATIENT MESSAGE (OUTPATIENT)
Dept: INTERNAL MEDICINE CLINIC | Facility: CLINIC | Age: 65
End: 2020-03-24

## 2020-03-24 ENCOUNTER — TELEPHONE (OUTPATIENT)
Dept: INTERNAL MEDICINE CLINIC | Facility: CLINIC | Age: 65
End: 2020-03-24

## 2020-03-24 DIAGNOSIS — R07.9 CHEST PAIN, UNSPECIFIED TYPE: Primary | ICD-10-CM

## 2020-03-24 DIAGNOSIS — E78.00 PURE HYPERCHOLESTEROLEMIA: ICD-10-CM

## 2020-03-24 DIAGNOSIS — Z82.49 FAMILY HISTORY OF HEART DISEASE: ICD-10-CM

## 2020-03-24 LAB
ATRIAL RATE: 72 BPM
P AXIS: 56 DEGREES
P-R INTERVAL: 182 MS
Q-T INTERVAL: 380 MS
QRS DURATION: 94 MS
QTC CALCULATION (BEZET): 416 MS
R AXIS: -15 DEGREES
T AXIS: 15 DEGREES
VENTRICULAR RATE: 72 BPM

## 2020-03-24 NOTE — TELEPHONE ENCOUNTER
Called pt to let him know AMS put in order for Stress test.  Advised he call Central scheduling to schedule.

## 2020-03-24 NOTE — TELEPHONE ENCOUNTER
Pt would like AMS to order a stress today, test needs to be done today if possible . Pt went to ED per AMS request, was given the option to go home which he did.  Was told to call PCP to have AMS order a stress test, that the ED MD would like to have Pt

## 2020-03-25 NOTE — TELEPHONE ENCOUNTER
From: Barb Huntley  To: Jo Dupree DO  Sent: 3/24/2020 7:50 PM CDT  Subject: Prescription Question    I'm not a person that wants to be on a lot of medications but do you think I ought to be on a low dosage high blood pressure medicine?  Last 2 blo

## 2020-03-25 NOTE — TELEPHONE ENCOUNTER
See other [employer] message from 3/24/2020. This RN responded with AMS instructions today.

## 2020-03-31 ENCOUNTER — TELEPHONE (OUTPATIENT)
Dept: INTERNAL MEDICINE CLINIC | Facility: CLINIC | Age: 65
End: 2020-03-31

## 2020-03-31 NOTE — TELEPHONE ENCOUNTER
Shantal with cardiology contacted and given AMS instructions to keep pt on schedule for stress test.

## 2020-04-06 ENCOUNTER — HOSPITAL ENCOUNTER (OUTPATIENT)
Dept: CT IMAGING | Facility: HOSPITAL | Age: 65
End: 2020-04-06
Attending: FAMILY MEDICINE
Payer: MEDICARE

## 2020-04-06 ENCOUNTER — HOSPITAL ENCOUNTER (OUTPATIENT)
Dept: CV DIAGNOSTICS | Facility: HOSPITAL | Age: 65
Discharge: HOME | End: 2020-04-06
Attending: FAMILY MEDICINE
Payer: MEDICARE

## 2020-04-06 DIAGNOSIS — Z82.49 FAMILY HISTORY OF HEART DISEASE: ICD-10-CM

## 2020-04-06 DIAGNOSIS — E78.00 PURE HYPERCHOLESTEROLEMIA: ICD-10-CM

## 2020-04-06 DIAGNOSIS — R07.9 CHEST PAIN, UNSPECIFIED TYPE: ICD-10-CM

## 2020-04-06 PROCEDURE — 93018 CV STRESS TEST I&R ONLY: CPT | Performed by: FAMILY MEDICINE

## 2020-04-06 PROCEDURE — 93017 CV STRESS TEST TRACING ONLY: CPT | Performed by: FAMILY MEDICINE

## 2020-04-20 ENCOUNTER — PATIENT MESSAGE (OUTPATIENT)
Dept: INTERNAL MEDICINE CLINIC | Facility: CLINIC | Age: 65
End: 2020-04-20

## 2020-04-20 NOTE — TELEPHONE ENCOUNTER
From: Alisson Del Valle  To: Gena Pryor DO  Sent: 4/20/2020 3:23 PM CDT  Subject: Non-Urgent Medical Question    Last night I had symptoms of headache, warm cheek and left arm and a little tingling of left hand. Felt very anxious.  My blood pressure was

## 2020-04-20 NOTE — TELEPHONE ENCOUNTER
He should make appt for in-office eval for 4/27 30 mins because he might need brain imaging. If symptoms recur in the meantime, he needs to go to immediate care.

## 2020-04-20 NOTE — TELEPHONE ENCOUNTER
Patient had normal stress test 4/6/2020. Pt was sent to ER for similar s/s 3/23/2020. Pt sent BP's on  4/8/2020 averaging 112/76 in AM and 117/1 in evening. AMS please advise.

## 2020-04-21 NOTE — TELEPHONE ENCOUNTER
Scheduled pt for 4/27/20 with AMS for 30 minutes at 8:30am.  Pt verbalizes understanding. Pt notified if s/s recur he will need to go to IC. Pt verbalizes understanding.

## 2020-04-27 ENCOUNTER — OFFICE VISIT (OUTPATIENT)
Dept: INTERNAL MEDICINE CLINIC | Facility: CLINIC | Age: 65
End: 2020-04-27
Payer: MEDICARE

## 2020-04-27 VITALS
WEIGHT: 155.19 LBS | TEMPERATURE: 98 F | RESPIRATION RATE: 16 BRPM | DIASTOLIC BLOOD PRESSURE: 80 MMHG | SYSTOLIC BLOOD PRESSURE: 132 MMHG | BODY MASS INDEX: 26.17 KG/M2 | HEIGHT: 64.49 IN | HEART RATE: 68 BPM

## 2020-04-27 DIAGNOSIS — F41.9 ANXIETY: ICD-10-CM

## 2020-04-27 DIAGNOSIS — R20.2 NUMBNESS AND TINGLING IN LEFT HAND: Primary | ICD-10-CM

## 2020-04-27 DIAGNOSIS — Z82.49 FAMILY HISTORY OF HEART DISEASE: ICD-10-CM

## 2020-04-27 DIAGNOSIS — L84 CORN: ICD-10-CM

## 2020-04-27 DIAGNOSIS — R20.0 NUMBNESS AND TINGLING IN LEFT HAND: Primary | ICD-10-CM

## 2020-04-27 PROCEDURE — 99214 OFFICE O/P EST MOD 30 MIN: CPT | Performed by: FAMILY MEDICINE

## 2020-04-27 RX ORDER — FAMOTIDINE 20 MG/1
TABLET ORAL
COMMUNITY
Start: 2020-03-12 | End: 2021-03-22

## 2020-04-27 RX ORDER — ESCITALOPRAM OXALATE 5 MG/1
TABLET ORAL
Qty: 60 TABLET | Refills: 0 | Status: SHIPPED | OUTPATIENT
Start: 2020-04-27 | End: 2020-09-18

## 2020-04-27 NOTE — PROGRESS NOTES
HPI:    Patient ID: Xiomara Machado is a 71year old male. HPI 65y/o male with long history of anxiety regarding his heart health here with continued concerns for symptoms he has been having intermittently for the past couple of months.  Notes while at Musculoskeletal: Negative for neck pain and neck stiffness. Neurological: Negative for dizziness, weakness, light-headedness and headaches.             Current Outpatient Medications   Medication Sig Dispense Refill   • famoTIDine (PEPCID) 20 MG Oral Ta of the defined types were placed in this encounter.       Meds This Visit:  Requested Prescriptions     Signed Prescriptions Disp Refills   • escitalopram 5 MG Oral Tab 60 tablet 0     Sig: One tab PO daily x one week, then 2 tabs PO daily       Imaging & R

## 2020-04-27 NOTE — PATIENT INSTRUCTIONS
Start Lexapro and take 5mg daily for one week, then increase to 10mg daily. Set up phone or video visit for 4 weeks from now to follow-up on how the medication is working. Call or message sooner than that if you have issues with the medication.  Keep taking

## 2020-04-28 ENCOUNTER — HOSPITAL ENCOUNTER (OUTPATIENT)
Dept: MRI IMAGING | Age: 65
Discharge: HOME | End: 2020-04-28
Attending: FAMILY MEDICINE
Payer: MEDICARE

## 2020-04-28 DIAGNOSIS — R20.0 NUMBNESS AND TINGLING IN LEFT HAND: ICD-10-CM

## 2020-04-28 DIAGNOSIS — R20.2 NUMBNESS AND TINGLING IN LEFT HAND: ICD-10-CM

## 2020-04-28 PROCEDURE — 70553 MRI BRAIN STEM W/O & W/DYE: CPT | Performed by: FAMILY MEDICINE

## 2020-04-28 PROCEDURE — A9575 INJ GADOTERATE MEGLUMI 0.1ML: HCPCS | Performed by: FAMILY MEDICINE

## 2020-05-26 PROBLEM — F41.9 ANXIETY: Status: ACTIVE | Noted: 2020-02-26

## 2020-05-26 NOTE — PATIENT INSTRUCTIONS
Hi Mr. Lee Opal,     It was nice talking with you today. I'm glad the Lexpro (escitalopram) is helping. Continue taking 10mg and we can reassess, if you want, continuing it after you are on it for 6 months.    You are scheduled for your \"[facility]

## 2020-05-26 NOTE — PROGRESS NOTES
HPI:    Patient ID: Merline Busman is a 71year old male. Virtual Telephone Check-In    The patient verbally consents to a Virtual/Telephone Check-In visit on 5/26/2020.     Patient understands and accepts financial responsibility for any deductible, c aspirin 81 MG Oral Tab Take 81 mg by mouth daily. Allergies:No Known Allergies   PHYSICAL EXAM:   Physical Exam   Constitutional: He is oriented to person, place, and time. Neurological: He is alert and oriented to person, place, and time.    Psychi

## 2020-09-11 ENCOUNTER — LAB ENCOUNTER (OUTPATIENT)
Dept: LAB | Age: 65
End: 2020-09-11
Attending: FAMILY MEDICINE
Payer: MEDICARE

## 2020-09-11 DIAGNOSIS — E78.00 PURE HYPERCHOLESTEROLEMIA: ICD-10-CM

## 2020-09-11 DIAGNOSIS — Z12.5 SCREENING FOR MALIGNANT NEOPLASM OF PROSTATE: ICD-10-CM

## 2020-09-11 DIAGNOSIS — R73.01 IMPAIRED FASTING GLUCOSE: ICD-10-CM

## 2020-09-11 LAB
ALBUMIN SERPL-MCNC: 3.9 G/DL (ref 3.4–5)
ALBUMIN/GLOB SERPL: 1.3 {RATIO} (ref 1–2)
ALP LIVER SERPL-CCNC: 87 U/L (ref 45–117)
ALT SERPL-CCNC: 25 U/L (ref 16–61)
ANION GAP SERPL CALC-SCNC: 3 MMOL/L (ref 0–18)
AST SERPL-CCNC: 35 U/L (ref 15–37)
BASOPHILS # BLD AUTO: 0.04 X10(3) UL (ref 0–0.2)
BASOPHILS NFR BLD AUTO: 0.8 %
BILIRUB SERPL-MCNC: 0.4 MG/DL (ref 0.1–2)
BUN BLD-MCNC: 18 MG/DL (ref 7–18)
BUN/CREAT SERPL: 16.8 (ref 10–20)
CALCIUM BLD-MCNC: 9.4 MG/DL (ref 8.5–10.1)
CHLORIDE SERPL-SCNC: 104 MMOL/L (ref 98–112)
CHOLEST SMN-MCNC: 170 MG/DL (ref ?–200)
CO2 SERPL-SCNC: 32 MMOL/L (ref 21–32)
CREAT BLD-MCNC: 1.07 MG/DL (ref 0.7–1.3)
DEPRECATED RDW RBC AUTO: 43.4 FL (ref 35.1–46.3)
EOSINOPHIL # BLD AUTO: 0.13 X10(3) UL (ref 0–0.7)
EOSINOPHIL NFR BLD AUTO: 2.5 %
ERYTHROCYTE [DISTWIDTH] IN BLOOD BY AUTOMATED COUNT: 12.9 % (ref 11–15)
GLOBULIN PLAS-MCNC: 3.1 G/DL (ref 2.8–4.4)
GLUCOSE BLD-MCNC: 101 MG/DL (ref 70–99)
HCT VFR BLD AUTO: 45.1 % (ref 39–53)
HDLC SERPL-MCNC: 80 MG/DL (ref 40–59)
HGB BLD-MCNC: 14.9 G/DL (ref 13–17.5)
IMM GRANULOCYTES # BLD AUTO: 0.01 X10(3) UL (ref 0–1)
IMM GRANULOCYTES NFR BLD: 0.2 %
LDLC SERPL CALC-MCNC: 75 MG/DL (ref ?–100)
LYMPHOCYTES # BLD AUTO: 1.63 X10(3) UL (ref 1–4)
LYMPHOCYTES NFR BLD AUTO: 31.5 %
M PROTEIN MFR SERPL ELPH: 7 G/DL (ref 6.4–8.2)
MCH RBC QN AUTO: 30.3 PG (ref 26–34)
MCHC RBC AUTO-ENTMCNC: 33 G/DL (ref 31–37)
MCV RBC AUTO: 91.7 FL (ref 80–100)
MONOCYTES # BLD AUTO: 0.53 X10(3) UL (ref 0.1–1)
MONOCYTES NFR BLD AUTO: 10.3 %
NEUTROPHILS # BLD AUTO: 2.83 X10 (3) UL (ref 1.5–7.7)
NEUTROPHILS # BLD AUTO: 2.83 X10(3) UL (ref 1.5–7.7)
NEUTROPHILS NFR BLD AUTO: 54.7 %
NONHDLC SERPL-MCNC: 90 MG/DL (ref ?–130)
OSMOLALITY SERPL CALC.SUM OF ELEC: 290 MOSM/KG (ref 275–295)
PATIENT FASTING Y/N/NP: YES
PATIENT FASTING Y/N/NP: YES
PLATELET # BLD AUTO: 189 10(3)UL (ref 150–450)
POTASSIUM SERPL-SCNC: 4.8 MMOL/L (ref 3.5–5.1)
RBC # BLD AUTO: 4.92 X10(6)UL (ref 3.8–5.8)
SODIUM SERPL-SCNC: 139 MMOL/L (ref 136–145)
TRIGL SERPL-MCNC: 73 MG/DL (ref 30–149)
VLDLC SERPL CALC-MCNC: 15 MG/DL (ref 0–30)
WBC # BLD AUTO: 5.2 X10(3) UL (ref 4–11)

## 2020-09-11 PROCEDURE — 36415 COLL VENOUS BLD VENIPUNCTURE: CPT

## 2020-09-11 PROCEDURE — 85025 COMPLETE CBC W/AUTO DIFF WBC: CPT

## 2020-09-11 PROCEDURE — 80061 LIPID PANEL: CPT

## 2020-09-11 PROCEDURE — 80053 COMPREHEN METABOLIC PANEL: CPT

## 2020-09-11 PROCEDURE — 83036 HEMOGLOBIN GLYCOSYLATED A1C: CPT

## 2020-09-12 DIAGNOSIS — R73.01 IMPAIRED FASTING GLUCOSE: Primary | ICD-10-CM

## 2020-09-12 LAB
EST. AVERAGE GLUCOSE BLD GHB EST-MCNC: 108 MG/DL (ref 68–126)
HBA1C MFR BLD HPLC: 5.4 % (ref ?–5.7)

## 2020-09-18 ENCOUNTER — TELEPHONE (OUTPATIENT)
Dept: INTERNAL MEDICINE CLINIC | Facility: CLINIC | Age: 65
End: 2020-09-18

## 2020-09-18 ENCOUNTER — OFFICE VISIT (OUTPATIENT)
Dept: INTERNAL MEDICINE CLINIC | Facility: CLINIC | Age: 65
End: 2020-09-18
Payer: MEDICARE

## 2020-09-18 VITALS
TEMPERATURE: 97 F | DIASTOLIC BLOOD PRESSURE: 64 MMHG | HEART RATE: 56 BPM | WEIGHT: 153 LBS | RESPIRATION RATE: 16 BRPM | HEIGHT: 64.57 IN | BODY MASS INDEX: 25.8 KG/M2 | SYSTOLIC BLOOD PRESSURE: 132 MMHG

## 2020-09-18 DIAGNOSIS — E78.00 PURE HYPERCHOLESTEROLEMIA: ICD-10-CM

## 2020-09-18 DIAGNOSIS — F41.9 ANXIETY: ICD-10-CM

## 2020-09-18 DIAGNOSIS — E04.1 THYROID NODULE: ICD-10-CM

## 2020-09-18 DIAGNOSIS — Z12.5 SCREENING PSA (PROSTATE SPECIFIC ANTIGEN): ICD-10-CM

## 2020-09-18 DIAGNOSIS — Z00.00 ENCOUNTER FOR ANNUAL HEALTH EXAMINATION: Primary | ICD-10-CM

## 2020-09-18 PROCEDURE — G0438 PPPS, INITIAL VISIT: HCPCS | Performed by: FAMILY MEDICINE

## 2020-09-18 RX ORDER — ESCITALOPRAM OXALATE 5 MG/1
5 TABLET ORAL DAILY
Qty: 90 TABLET | Refills: 1 | Status: SHIPPED | OUTPATIENT
Start: 2020-09-18 | End: 2020-11-27

## 2020-09-18 NOTE — TELEPHONE ENCOUNTER
Pt is coming on date below for pneumonia shot and high dose flu shot.  Please advise    Future Appointments   Date Time Provider Elisha Montoya   10/20/2020  8:30 AM [facility] N[facility]SE [facility]

## 2020-09-18 NOTE — PROGRESS NOTES
HPI:   Lamonte Bailey is a 76year old male who presents for a Medicare Initial Preventative Physical Exam (Welcome to Medicare- < 12 months on Medicare). Anxiety- controlled per patient on med. Wants to reduce dose or stop med.      Pure hypercholes Last Chemistry Labs:   Lab Results   Component Value Date    AST 35 09/11/2020    ALT 25 09/11/2020    CA 9.4 09/11/2020    ALB 3.9 09/11/2020    TSH 1.5 09/10/2019    CREATSERUM 1.07 09/11/2020     (H) 09/11/2020        CBC  (most recent labs night nocturia, no complaint of urinary incontinence  MUSCULOSKELETAL: denies back pain  NEURO: denies headaches  PSYCHE: denies depression or anxiety  HEMATOLOGIC: denies hx of anemia  ENDOCRINE: denies thyroid history  ALL/ASTHMA: denies hx of allergy or color, texture, turgor normal, no rashes or lesions   Lymph nodes: Cervical, supraclavicular, and axillary nodes normal   Neurologic: Normal            Vaccination History     Immunization History   Administered Date(s) Administered   • FLU VAC High Dose 6 total) by mouth daily. Pure hypercholesterolemia- controlled on statin. Continue. Thyroid nodule- F/u ENT. Screening PSA (prostate specific antigen)  -     PSA, DIAGNOSTIC    Return in 1 year (on 9/18/2021).      Jesus Joseph DO, 9/18/2020 Influenza  Covered Annually 9/27/2019   Please get every year    Pneumococcal 13 (Prevnar)  Covered Once after 65 09/27/2019 Please get once after your 65th birthday    Pneumococcal 23 (Pneumovax)  Covered Once after 65 No vaccine history found Please get

## 2020-09-21 RX ORDER — PRAVASTATIN SODIUM 20 MG
20 TABLET ORAL NIGHTLY
Qty: 90 TABLET | Refills: 3 | Status: SHIPPED | OUTPATIENT
Start: 2020-09-21 | End: 2021-04-02

## 2020-09-21 NOTE — TELEPHONE ENCOUNTER
Last OV 9.18.20 w/ AMS (annual pe)    Last PE 9.18.20   Last REFILL 3.19.20 Pravastatin 20mg #90 1R  Last LABS 9.11.20 CBC, Lipid, CMP, HgA1c    Future Appointments   Date Time Provider Elisha Montoya   10/20/2020  8:30 AM EMG 35 NURSE EMG 35 75[facility] EMG 7

## 2020-10-12 ENCOUNTER — TELEPHONE (OUTPATIENT)
Dept: INTERNAL MEDICINE CLINIC | Facility: CLINIC | Age: 65
End: 2020-10-12

## 2020-10-12 NOTE — TELEPHONE ENCOUNTER
Please call patient. He is due for PSA blood test. Order is at [facility] but we have listed [address] as his preferred lab so just verify which lab and send back to me if I need to change.

## 2020-10-13 NOTE — TELEPHONE ENCOUNTER
ROMETCPATTI to verify where the lab should go. Order went to [place], his preferred lab is [facility].

## 2020-10-13 NOTE — TELEPHONE ENCOUNTER
Pt called back and conf his appt is at [employer]     [facility] preferred lab to [facility]

## 2020-10-20 ENCOUNTER — APPOINTMENT (OUTPATIENT)
Dept: INTERNAL MEDICINE CLINIC | Facility: CLINIC | Age: 65
End: 2020-10-20
Payer: MEDICARE

## 2020-10-20 PROCEDURE — G0009 ADMIN PNEUMOCOCCAL VACCINE: HCPCS | Performed by: FAMILY MEDICINE

## 2020-10-20 PROCEDURE — 90662 IIV NO PRSV INCREASED AG IM: CPT | Performed by: FAMILY MEDICINE

## 2020-10-20 PROCEDURE — G0008 ADMIN INFLUENZA VIRUS VAC: HCPCS | Performed by: FAMILY MEDICINE

## 2020-10-20 PROCEDURE — 90732 PPSV23 VACC 2 YRS+ SUBQ/IM: CPT | Performed by: FAMILY MEDICINE

## 2020-11-27 DIAGNOSIS — F41.9 ANXIETY: ICD-10-CM

## 2020-11-30 RX ORDER — ESCITALOPRAM OXALATE 5 MG/1
5 TABLET ORAL DAILY
Qty: 90 TABLET | Refills: 2 | Status: SHIPPED | OUTPATIENT
Start: 2020-11-30 | End: 2021-03-22 | Stop reason: DRUGHIGH

## 2020-11-30 NOTE — TELEPHONE ENCOUNTER
LOV:9/18/20, CPE, AMS  Last CPE:9/18/20, CPE, AMS  Last refill:escitalopram 5 MG Oral Tab,9/18/20  Quantity:90 tablet, 1 refills  Last labs that are related:a1c, cbc 9/11/20  Future appointment:No future appointments.   Protocol: pend for provider    Please

## 2020-12-18 ENCOUNTER — PATIENT MESSAGE (OUTPATIENT)
Dept: INTERNAL MEDICINE CLINIC | Facility: CLINIC | Age: 65
End: 2020-12-18

## 2020-12-21 NOTE — TELEPHONE ENCOUNTER
From: Eric Johnson  To: Annalee Lozoya DO  Sent: 12/18/2020 6:46 PM CST  Subject: Other    How and when should I get in line to get the COVID vaccine next year? I’ll be 66 in June. Office visit or [facility]?

## 2021-01-27 ENCOUNTER — IMMUNIZATION (OUTPATIENT)
Dept: LAB | Facility: HOSPITAL | Age: 66
End: 2021-01-27
Attending: EMERGENCY MEDICINE
Payer: MEDICARE

## 2021-01-27 DIAGNOSIS — Z23 NEED FOR VACCINATION: Primary | ICD-10-CM

## 2021-01-27 PROCEDURE — 0011A SARSCOV2 VAC 100MCG/0.5ML IM: CPT

## 2021-02-22 ENCOUNTER — PATIENT MESSAGE (OUTPATIENT)
Dept: INTERNAL MEDICINE CLINIC | Facility: CLINIC | Age: 66
End: 2021-02-22

## 2021-02-22 RX ORDER — ESCITALOPRAM OXALATE 10 MG/1
10 TABLET ORAL DAILY
Qty: 90 TABLET | Refills: 0 | Status: SHIPPED | OUTPATIENT
Start: 2021-02-22 | End: 2021-04-13

## 2021-02-24 ENCOUNTER — IMMUNIZATION (OUTPATIENT)
Dept: LAB | Facility: HOSPITAL | Age: 66
End: 2021-02-24
Attending: EMERGENCY MEDICINE
Payer: MEDICARE

## 2021-02-24 DIAGNOSIS — Z23 NEED FOR VACCINATION: Primary | ICD-10-CM

## 2021-02-24 PROCEDURE — 0012A SARSCOV2 VAC 100MCG/0.5ML IM: CPT

## 2021-03-22 NOTE — PROGRESS NOTES
HPI/Subjective:   Patient ID: Gracie Guevara is a 76year old male. HPI Here for f/u on Lexapro. Patient increased to 10mg about 4 weeks ago. Since then has had some episodes of anxiety, has needed to take Xanax twice.  Does note he doesn't feel like h

## 2021-03-22 NOTE — PATIENT INSTRUCTIONS
Increase your Lexapro to 15mg daily and let me know how you are feeling on the 15mg after about 3-4 weeks.

## 2021-04-05 RX ORDER — PRAVASTATIN SODIUM 20 MG
20 TABLET ORAL NIGHTLY
Qty: 90 TABLET | Refills: 1 | Status: SHIPPED | OUTPATIENT
Start: 2021-04-05 | End: 2021-10-11

## 2021-04-05 NOTE — TELEPHONE ENCOUNTER
Last visit- 03/22/2021 anxiety     Last refill- 09/21/2020 pravastatin sodium 20mg QTY90 3R    Last labs-  09/11/2020 hemoglobin a1c, cmp, lipid, cbc    No future appointments.

## 2021-04-14 RX ORDER — ESCITALOPRAM OXALATE 10 MG/1
15 TABLET ORAL DAILY
Qty: 135 TABLET | Refills: 0 | Status: SHIPPED | OUTPATIENT
Start: 2021-04-14 | End: 2021-07-09

## 2021-04-14 NOTE — TELEPHONE ENCOUNTER
LOV:3/22/21, Anxiety, AMS  Last CPE:9/18/20, CPE, AMS  Last refill:escitalopram 10 MG Oral Tab,2/22/21  Quantity: 90 tablet, 0 refills  Last labs that are related: cbc 9/11/20  Future appointment:No future appointments.   Protocol: pend for provider, no pro

## 2021-07-09 RX ORDER — ESCITALOPRAM OXALATE 10 MG/1
15 TABLET ORAL DAILY
Qty: 135 TABLET | Refills: 2 | Status: SHIPPED | OUTPATIENT
Start: 2021-07-09 | End: 2021-10-07

## 2021-07-09 NOTE — TELEPHONE ENCOUNTER
Last visit- 03/22/2021 anxiety    Last refill-  04/14/2021 escitalopram 10mg [id number] 0R    Last labs-  09/11/2020 hemoglobin a1c, cmp, lipid, cbc    No future appointments.

## 2021-07-14 NOTE — TELEPHONE ENCOUNTER
FWD to AMS, Lattie Blizzard
From: Rafa Vitale  To: Edison Kent DO  Sent: 2/22/2021 8:20 AM CST  Subject: Prescription Question    I have been feeling more anxious lately (last week) and would like to go from 5MG to 10MG on my Escitalopram Oxalate.  Can you provide a new presc
Future Appointments   Date Time Provider Elisha Montoya   3/22/2021  2:00 PM Edison Ward, DO EM[facility] 35 75TH EMG 75TH
I sent Rx but if he still feels more anxious after a few weeks on the new dose, he needs to make appt to see me.
LOV 9/18/20
LOV 9/18/20 with AMS. AMS, ok to schedule appt with you?
Ok to schedule appt. 30 mins.
show

## 2021-07-21 ENCOUNTER — TELEPHONE (OUTPATIENT)
Dept: INTERNAL MEDICINE CLINIC | Facility: CLINIC | Age: 66
End: 2021-07-21

## 2021-07-22 ENCOUNTER — TELEPHONE (OUTPATIENT)
Dept: INTERNAL MEDICINE CLINIC | Facility: CLINIC | Age: 66
End: 2021-07-22

## 2021-07-22 DIAGNOSIS — Z12.5 SCREENING PSA (PROSTATE SPECIFIC ANTIGEN): ICD-10-CM

## 2021-07-22 DIAGNOSIS — E78.00 PURE HYPERCHOLESTEROLEMIA: ICD-10-CM

## 2021-07-22 DIAGNOSIS — Z00.00 ENCOUNTER FOR ANNUAL HEALTH EXAMINATION: Primary | ICD-10-CM

## 2021-07-22 NOTE — TELEPHONE ENCOUNTER
Future Appointments   Date Time Provider Elisha Montoya   10/4/2021  1:30 PM Wayne Smoke, DO EMG 35 75TH EMG 75TH     Annual Physical   Lab is [facility]   Pt aware to fast and to complete labs no sooner than 2 weeks prior to physical   No call back requ

## 2021-09-16 NOTE — TELEPHONE ENCOUNTER
Future Appointments   Date Time Provider Elisha Montoya   9/17/2021  9:00 AM REFERENCE [id number] [address]   10/4/2021  1:30 PM Carlos Copeland, DO EMG 35 75TH EMG 75TH     Please change [facility] orders to [facility].

## 2021-09-17 ENCOUNTER — LAB ENCOUNTER (OUTPATIENT)
Dept: LAB | Age: 66
End: 2021-09-17
Attending: FAMILY MEDICINE
Payer: MEDICARE

## 2021-09-17 LAB
ALBUMIN SERPL-MCNC: 3.6 G/DL (ref 3.4–5)
ALBUMIN/GLOB SERPL: 1.1 {RATIO} (ref 1–2)
ALP LIVER SERPL-CCNC: 96 U/L
ALT SERPL-CCNC: 26 U/L
ANION GAP SERPL CALC-SCNC: 4 MMOL/L (ref 0–18)
AST SERPL-CCNC: 33 U/L (ref 15–37)
BASOPHILS # BLD AUTO: 0.06 X10(3) UL (ref 0–0.2)
BASOPHILS NFR BLD AUTO: 1 %
BILIRUB SERPL-MCNC: 0.5 MG/DL (ref 0.1–2)
BUN BLD-MCNC: 17 MG/DL (ref 7–18)
CALCIUM BLD-MCNC: 9.3 MG/DL (ref 8.5–10.1)
CHLORIDE SERPL-SCNC: 106 MMOL/L (ref 98–112)
CHOLEST SERPL-MCNC: 170 MG/DL (ref ?–200)
CO2 SERPL-SCNC: 28 MMOL/L (ref 21–32)
CREAT BLD-MCNC: 0.99 MG/DL
EOSINOPHIL # BLD AUTO: 0.13 X10(3) UL (ref 0–0.7)
EOSINOPHIL NFR BLD AUTO: 2.2 %
ERYTHROCYTE [DISTWIDTH] IN BLOOD BY AUTOMATED COUNT: 13.7 %
GLOBULIN PLAS-MCNC: 3.4 G/DL (ref 2.8–4.4)
GLUCOSE BLD-MCNC: 99 MG/DL (ref 70–99)
HCT VFR BLD AUTO: 43.7 %
HDLC SERPL-MCNC: 80 MG/DL (ref 40–59)
HGB BLD-MCNC: 14.2 G/DL
IMM GRANULOCYTES # BLD AUTO: 0.01 X10(3) UL (ref 0–1)
IMM GRANULOCYTES NFR BLD: 0.2 %
LDLC SERPL CALC-MCNC: 80 MG/DL (ref ?–100)
LYMPHOCYTES # BLD AUTO: 1.6 X10(3) UL (ref 1–4)
LYMPHOCYTES NFR BLD AUTO: 26.8 %
MCH RBC QN AUTO: 30.5 PG (ref 26–34)
MCHC RBC AUTO-ENTMCNC: 32.5 G/DL (ref 31–37)
MCV RBC AUTO: 94 FL
MONOCYTES # BLD AUTO: 0.71 X10(3) UL (ref 0.1–1)
MONOCYTES NFR BLD AUTO: 11.9 %
NEUTROPHILS # BLD AUTO: 3.45 X10 (3) UL (ref 1.5–7.7)
NEUTROPHILS # BLD AUTO: 3.45 X10(3) UL (ref 1.5–7.7)
NEUTROPHILS NFR BLD AUTO: 57.9 %
NONHDLC SERPL-MCNC: 90 MG/DL (ref ?–130)
OSMOLALITY SERPL CALC.SUM OF ELEC: 288 MOSM/KG (ref 275–295)
PATIENT FASTING Y/N/NP: YES
PATIENT FASTING Y/N/NP: YES
PLATELET # BLD AUTO: 207 10(3)UL (ref 150–450)
POTASSIUM SERPL-SCNC: 4.5 MMOL/L (ref 3.5–5.1)
PROT SERPL-MCNC: 7 G/DL (ref 6.4–8.2)
PSA SERPL-MCNC: 1.27 NG/ML (ref ?–4)
RBC # BLD AUTO: 4.65 X10(6)UL
SODIUM SERPL-SCNC: 138 MMOL/L (ref 136–145)
TRIGL SERPL-MCNC: 46 MG/DL (ref 30–149)
VLDLC SERPL CALC-MCNC: 7 MG/DL (ref 0–30)
WBC # BLD AUTO: 6 X10(3) UL (ref 4–11)

## 2021-09-17 PROCEDURE — 80053 COMPREHEN METABOLIC PANEL: CPT | Performed by: FAMILY MEDICINE

## 2021-09-17 PROCEDURE — 84153 ASSAY OF PSA TOTAL: CPT | Performed by: FAMILY MEDICINE

## 2021-09-17 PROCEDURE — 80061 LIPID PANEL: CPT | Performed by: FAMILY MEDICINE

## 2021-09-17 PROCEDURE — 36415 COLL VENOUS BLD VENIPUNCTURE: CPT | Performed by: FAMILY MEDICINE

## 2021-09-17 PROCEDURE — 85025 COMPLETE CBC W/AUTO DIFF WBC: CPT | Performed by: FAMILY MEDICINE

## 2021-10-04 ENCOUNTER — OFFICE VISIT (OUTPATIENT)
Dept: INTERNAL MEDICINE CLINIC | Facility: CLINIC | Age: 66
End: 2021-10-04
Payer: MEDICARE

## 2021-10-04 VITALS
SYSTOLIC BLOOD PRESSURE: 134 MMHG | BODY MASS INDEX: 27.29 KG/M2 | DIASTOLIC BLOOD PRESSURE: 72 MMHG | HEIGHT: 64.57 IN | OXYGEN SATURATION: 97 % | WEIGHT: 161.81 LBS | HEART RATE: 59 BPM

## 2021-10-04 DIAGNOSIS — Z00.00 ENCOUNTER FOR ANNUAL HEALTH EXAMINATION: Primary | ICD-10-CM

## 2021-10-04 DIAGNOSIS — E04.1 THYROID NODULE: ICD-10-CM

## 2021-10-04 DIAGNOSIS — R68.89 THROAT CONGESTION: ICD-10-CM

## 2021-10-04 DIAGNOSIS — F41.9 ANXIETY: ICD-10-CM

## 2021-10-04 DIAGNOSIS — E78.00 PURE HYPERCHOLESTEROLEMIA: ICD-10-CM

## 2021-10-04 PROCEDURE — G0439 PPPS, SUBSEQ VISIT: HCPCS | Performed by: FAMILY MEDICINE

## 2021-10-04 RX ORDER — CETIRIZINE HYDROCHLORIDE 10 MG/1
TABLET ORAL
COMMUNITY
Start: 2021-09-10

## 2021-10-04 NOTE — PROGRESS NOTES
HPI:   Alejandrina Alfaro is a 78year old male who presents for a Medicare Subsequent Annual Wellness visit (Pt already had Initial Annual Wellness). Encounter for annual health examination- continues to exercise daily.      Anxiety- taking Lexapro- mos Lab Results   Component Value Date    CHOLEST 170 09/17/2021    HDL 80 (H) 09/17/2021    LDL 80 09/17/2021    TRIG 46 09/17/2021          Last Chemistry Labs:   Lab Results   Component Value Date    AST 33 09/17/2021    ALT 26 09/17/2021    CA 9.3 09/17/ denies shortness of breath with exertion  CARDIOVASCULAR: denies chest pain on exertion  GI: denies abdominal pain, denies heartburn  : 1 per night nocturia, no complaint of urinary incontinence  MUSCULOSKELETAL: denies back pain  NEURO: denies headaches supraclavicular, and axillary nodes normal   Neurologic: Normal            Vaccination History     Immunization History   Administered Date(s) Administered   • Covid-19 Vaccine Moderna 100 mcg/0.5 ml 01/27/2021, 02/24/2021   • [facility]ose [facility] Return in 1 year (on 10/4/2022), or if symptoms worsen or fail to improve.      Julieta Coello, DO, 10/4/2021     General Health     In the past six months, have you lost more than 10 pounds without trying?: 2 - No  Has your appetite been poor?: No  How Screening  Covered for ages 52-80; only need ONE of the following:    Colonoscopy   Covered every 10 years    Covered every 2 years if patient is at high risk or previous colonoscopy was abnormal 12/10/2019    Colonoscopy due on 12/10/2024    Flexible Sigm

## 2021-10-04 NOTE — PATIENT INSTRUCTIONS
Switch from Claritin to Allegra or Zyrtec or Xyzal and add Flonase. Do both daily for two weeks. If this doesn't help, stop the medications and start over-the-counter Prilosec for 2 weeks.          Idalia Leonard's SCREENING SCHEDULE   Tests on this list Lab Results   Component Value Date    PSA 1.27 09/17/2021     PSA due on 09/17/2023   Immunizations    Influenza Covered once per flu season  Please get every year 10/20/2020  Influenza Vaccine(1) due on 10/01/2021    Pneumococcal Each vaccine [address]

## 2021-10-11 RX ORDER — PRAVASTATIN SODIUM 20 MG
20 TABLET ORAL NIGHTLY
Qty: 90 TABLET | Refills: 3 | Status: SHIPPED | OUTPATIENT
Start: 2021-10-11 | End: 2022-01-08

## 2021-10-11 NOTE — TELEPHONE ENCOUNTER
Last visit- 10/04/2021 cpe    Last refill- 04/05/2021 pravastatin sodium 20mg QTY90 1R    Last labs- 09/17/2021 cmp, cbc, lipid, psa   Future Appointments   Date Time Provider Elisha Montoya   10/19/2021  1:00 PM EMG 35 NURSE EMG 35 75TH EMG 75TH

## 2021-10-19 ENCOUNTER — IMMUNIZATION (OUTPATIENT)
Dept: INTERNAL MEDICINE CLINIC | Facility: CLINIC | Age: 66
End: 2021-10-19
Payer: MEDICARE

## 2021-10-19 DIAGNOSIS — Z23 NEED FOR VACCINATION: Primary | ICD-10-CM

## 2021-10-19 PROCEDURE — G0008 ADMIN INFLUENZA VIRUS VAC: HCPCS | Performed by: FAMILY MEDICINE

## 2021-10-19 PROCEDURE — 90662 IIV NO PRSV INCREASED AG IM: CPT | Performed by: FAMILY MEDICINE

## 2021-12-22 ENCOUNTER — OFFICE VISIT (OUTPATIENT)
Dept: INTERNAL MEDICINE CLINIC | Facility: CLINIC | Age: 66
End: 2021-12-22
Payer: MEDICARE

## 2021-12-22 VITALS
BODY MASS INDEX: 27.43 KG/M2 | OXYGEN SATURATION: 97 % | WEIGHT: 162.63 LBS | HEIGHT: 64.57 IN | SYSTOLIC BLOOD PRESSURE: 136 MMHG | DIASTOLIC BLOOD PRESSURE: 78 MMHG | HEART RATE: 68 BPM

## 2021-12-22 DIAGNOSIS — R03.0 ELEVATED BLOOD-PRESSURE READING, WITHOUT DIAGNOSIS OF HYPERTENSION: Primary | ICD-10-CM

## 2021-12-22 PROCEDURE — 99213 OFFICE O/P EST LOW 20 MIN: CPT | Performed by: FAMILY MEDICINE

## 2021-12-25 RX ORDER — ESCITALOPRAM OXALATE 10 MG/1
TABLET ORAL
COMMUNITY
Start: 2021-10-09

## 2021-12-25 NOTE — PROGRESS NOTES
Subjective:   Patient ID: Tobias Robins is a 78year old male. HPI Here with concern for elevated blood pressures. Had checked at Ortho visit and was 170s/100s. Patient then started checking his BP at home. Averaging <140/90 at home.      History/Othe

## 2022-01-10 RX ORDER — PRAVASTATIN SODIUM 20 MG
20 TABLET ORAL NIGHTLY
Qty: 90 TABLET | Refills: 2 | Status: SHIPPED | OUTPATIENT
Start: 2022-01-10

## 2022-01-10 NOTE — TELEPHONE ENCOUNTER
Last visit- 12/22/2021 elevated blood pressure reading     Last refill- 10/11/2021 pravastatin 20mg QTY90 3R    Last labs- 09/17/2021 cmp, cbc, lipid, psa     No future appointments.

## 2022-04-04 RX ORDER — PRAVASTATIN SODIUM 20 MG
20 TABLET ORAL NIGHTLY
Qty: 90 TABLET | Refills: 2 | Status: SHIPPED | OUTPATIENT
Start: 2022-04-04

## 2022-04-04 NOTE — TELEPHONE ENCOUNTER
Last OV 12.22.21 (f/up)   Last PE 10.4.21  Last REFILL 1.10.22 Pravastatin 20mg #90 2R  Last LABS 9.17.21 CMP, CBC, Lipid, PSA    No future appointments. Per PROTOCOL?  PASSED    Please Advise

## 2022-06-17 ENCOUNTER — PATIENT MESSAGE (OUTPATIENT)
Dept: INTERNAL MEDICINE CLINIC | Facility: CLINIC | Age: 67
End: 2022-06-17

## 2022-06-17 DIAGNOSIS — Z13.0 SCREENING FOR DEFICIENCY ANEMIA: ICD-10-CM

## 2022-06-17 DIAGNOSIS — Z13.21 ENCOUNTER FOR VITAMIN DEFICIENCY SCREENING: ICD-10-CM

## 2022-06-17 DIAGNOSIS — Z13.1 SCREENING FOR DIABETES MELLITUS: ICD-10-CM

## 2022-06-17 DIAGNOSIS — Z12.5 SCREENING PSA (PROSTATE SPECIFIC ANTIGEN): ICD-10-CM

## 2022-06-17 DIAGNOSIS — E78.00 PURE HYPERCHOLESTEROLEMIA: Primary | ICD-10-CM

## 2022-06-17 RX ORDER — ESCITALOPRAM OXALATE 10 MG/1
10 TABLET ORAL DAILY
Qty: 90 TABLET | Refills: 1 | Status: SHIPPED | OUTPATIENT
Start: 2022-06-17

## 2022-06-17 NOTE — TELEPHONE ENCOUNTER
Last visit- 12/22/2021 elevated blood-pressure     Last refill- 10/09/2021 escitalopram 10mg (external/patient reported)    Last labs- 09/17/2021 cmp, cbc, lipid, psa     No future appointments.

## 2022-06-17 NOTE — TELEPHONE ENCOUNTER
From: Fran Barth  To: Luis Huizar DO  Sent: 6/17/2022 11:59 AM CDT  Subject: Prescription Refill    Can you renew my prescription for Escitalopram 10MG ? I was unable to renew on My Chart.

## 2022-06-21 ENCOUNTER — TELEPHONE (OUTPATIENT)
Dept: INTERNAL MEDICINE CLINIC | Facility: CLINIC | Age: 67
End: 2022-06-21

## 2022-06-21 NOTE — TELEPHONE ENCOUNTER
Future Appointments   Date Time Provider Elisha Lindsay   9/26/2022  7:30 AM REFERENCE [id number] Ref [address]   10/4/2022  9:00 AM Tona Mcguire, DO EMG 35 75TH EMG 75TH     Orders to edward- Pt informed that labs need to be completed no sooner than 2 weeks prior to the appt.  Pt aware to fast-no call back required    Pt requesting Vit D order as well

## 2022-06-22 NOTE — TELEPHONE ENCOUNTER
Has his vitamin D been low in the past? If not, typically insurances will not cover vitamin D blood test as a \"screening test\". But if he wants to potentially pay out of pocket for the test I can still add it.

## 2022-06-22 NOTE — TELEPHONE ENCOUNTER
Patient scheduled for 10/4/2022  Please advise on patient request for blood work. Would you like to place at appointment?

## 2022-06-23 NOTE — TELEPHONE ENCOUNTER
Vitamin D has not been checked in past. Patient aware that Vitamin D Lab may be an out of pocket expense. Patient requesting vitamin D be added to labs.

## 2022-08-08 ENCOUNTER — TELEPHONE (OUTPATIENT)
Dept: INTERNAL MEDICINE CLINIC | Facility: CLINIC | Age: 67
End: 2022-08-08

## 2022-08-08 NOTE — TELEPHONE ENCOUNTER
Symptoms started 8/7/22. Scratchy throat, fatigue. No shortness of breath. Positive COVID 8/8/22. Wants paxlovid. Discussed risks/benefits/potential side effects and proper use of medication. Stop Pravastatin for duration of treatment. ER warning signs given.

## 2022-09-06 RX ORDER — ESCITALOPRAM OXALATE 10 MG/1
10 TABLET ORAL DAILY
Qty: 90 TABLET | Refills: 1 | Status: SHIPPED | OUTPATIENT
Start: 2022-09-06

## 2022-09-06 NOTE — TELEPHONE ENCOUNTER
Last visit- 12/22/2021 elevated blood-pressure reading     Last refill- 06/17/2022 escitalopram 10mg QTY90 1R    Last labs- 09/17/2021 cmp, cbc, lipid, psa   Future Appointments   Date Time Provider Elisha Montoya   9/22/2022  7:30 AM [facility] [address] [place] [address]   9/30/2022 10:00 AM REFERENCE [id number] [address]   10/7/2022  8:00 AM Debora Riddle DO EMG 35 75TH EMG 75TH

## 2022-09-21 ENCOUNTER — ORDER TRANSCRIPTION (OUTPATIENT)
Dept: ADMINISTRATIVE | Facility: HOSPITAL | Age: 67
End: 2022-09-21

## 2022-09-21 DIAGNOSIS — Z13.6 SCREENING FOR CARDIOVASCULAR CONDITION: Primary | ICD-10-CM

## 2022-09-22 ENCOUNTER — ORDER TRANSCRIPTION (OUTPATIENT)
Dept: ADMINISTRATIVE | Facility: HOSPITAL | Age: 67
End: 2022-09-22

## 2022-09-22 ENCOUNTER — HOSPITAL ENCOUNTER (OUTPATIENT)
Dept: CT IMAGING | Facility: HOSPITAL | Age: 67
Discharge: HOME | End: 2022-09-22
Attending: FAMILY MEDICINE

## 2022-09-22 DIAGNOSIS — Z13.9 ENCOUNTER FOR SCREENING: Primary | ICD-10-CM

## 2022-09-22 DIAGNOSIS — Z13.6 SCREENING FOR CARDIOVASCULAR CONDITION: ICD-10-CM

## 2022-09-22 NOTE — PROGRESS NOTES
Date of Service 9/22/2022    Hanh Barrios  Date of Birth 6/4/1955    Patient Age: 75year old    PCP: Ita Jose, DO  [address] [place]    Consult Type  Type Scan/Screening: Heart Scan  Preliminary Heart Scan Score: 225.31                Body Mass Index  There is no height or weight on file to calculate BMI. Lipid Profile  Cholesterol: 170, done on 9/17/2021. HDL Cholesterol: 80, done on 9/17/2021. LDL Cholesterol: 80, done on 9/17/2021. TriGlycerides 46, done on 9/17/2021. Nurse Review  Risk factor information and results reviewed with Nurse: Yes (elevated cholesterol; takes medication as prescribed by MD)    Recommended Follow Up:  Consult your physician regarding[de-identified] Final Heart Scan Report; Discuss potential for Incidental Finding    Free PV Screening offered to patient. Recommendations for Change:     Cholesterol Modification (goal of therapy depends upon your risk):  (2021 Lipid profile within normal range.)  Exercise: No Change Needed  Smoking Cessation: No Change Needed  Weight Management: Maintain Current Weight  Stress Management: No Change Needed  Repeat Heart Scan: 3 Years if Calcium Score is > 0. 0; Discuss with your Physician     Other[de-identified] Declined optional cholesterol test today, has order from MD to have done. Dayanna Recommended Resources:  Recommended Resources: Upcoming Classes, Medical Services and [facility]. [facility]. Adrienne Galloway RN        Please Contact the Nurse Heart Line with any Questions or Concerns [phone].

## 2022-09-30 ENCOUNTER — LAB ENCOUNTER (OUTPATIENT)
Dept: LAB | Age: 67
End: 2022-09-30
Attending: FAMILY MEDICINE
Payer: MEDICARE

## 2022-09-30 DIAGNOSIS — Z13.21 ENCOUNTER FOR VITAMIN DEFICIENCY SCREENING: ICD-10-CM

## 2022-09-30 DIAGNOSIS — E78.00 PURE HYPERCHOLESTEROLEMIA: ICD-10-CM

## 2022-09-30 DIAGNOSIS — Z12.5 SCREENING PSA (PROSTATE SPECIFIC ANTIGEN): ICD-10-CM

## 2022-09-30 DIAGNOSIS — Z13.1 SCREENING FOR DIABETES MELLITUS: ICD-10-CM

## 2022-09-30 DIAGNOSIS — Z13.0 SCREENING FOR DEFICIENCY ANEMIA: ICD-10-CM

## 2022-09-30 LAB
ALBUMIN SERPL-MCNC: 4.2 G/DL (ref 3.4–5)
ALBUMIN/GLOB SERPL: 1.4 {RATIO} (ref 1–2)
ALP LIVER SERPL-CCNC: 75 U/L
ALT SERPL-CCNC: 28 U/L
ANION GAP SERPL CALC-SCNC: 3 MMOL/L (ref 0–18)
AST SERPL-CCNC: 37 U/L (ref 15–37)
BASOPHILS # BLD AUTO: 0.03 X10(3) UL (ref 0–0.2)
BASOPHILS NFR BLD AUTO: 0.7 %
BILIRUB SERPL-MCNC: 0.5 MG/DL (ref 0.1–2)
BUN BLD-MCNC: 14 MG/DL (ref 7–18)
BUN/CREAT SERPL: 12.1 (ref 10–20)
CALCIUM BLD-MCNC: 9.5 MG/DL (ref 8.5–10.1)
CHLORIDE SERPL-SCNC: 104 MMOL/L (ref 98–112)
CHOLEST SERPL-MCNC: 183 MG/DL (ref ?–200)
CO2 SERPL-SCNC: 31 MMOL/L (ref 21–32)
COMPLEXED PSA SERPL-MCNC: 1.58 NG/ML (ref ?–4)
CREAT BLD-MCNC: 1.16 MG/DL
DEPRECATED RDW RBC AUTO: 48.6 FL (ref 35.1–46.3)
EOSINOPHIL # BLD AUTO: 0.1 X10(3) UL (ref 0–0.7)
EOSINOPHIL NFR BLD AUTO: 2.3 %
ERYTHROCYTE [DISTWIDTH] IN BLOOD BY AUTOMATED COUNT: 13.6 % (ref 11–15)
FASTING PATIENT LIPID ANSWER: YES
FASTING STATUS PATIENT QL REPORTED: YES
GFR SERPLBLD BASED ON 1.73 SQ M-ARVRAT: 69 ML/MIN/1.73M2 (ref 60–?)
GLOBULIN PLAS-MCNC: 2.9 G/DL (ref 2.8–4.4)
GLUCOSE BLD-MCNC: 106 MG/DL (ref 70–99)
HCT VFR BLD AUTO: 44.5 %
HDLC SERPL-MCNC: 91 MG/DL (ref 40–59)
HGB BLD-MCNC: 14.3 G/DL
IMM GRANULOCYTES # BLD AUTO: 0.01 X10(3) UL (ref 0–1)
IMM GRANULOCYTES NFR BLD: 0.2 %
LDLC SERPL CALC-MCNC: 83 MG/DL (ref ?–100)
LYMPHOCYTES # BLD AUTO: 1.35 X10(3) UL (ref 1–4)
LYMPHOCYTES NFR BLD AUTO: 30.7 %
MCH RBC QN AUTO: 30.6 PG (ref 26–34)
MCHC RBC AUTO-ENTMCNC: 32.1 G/DL (ref 31–37)
MCV RBC AUTO: 95.3 FL
MONOCYTES # BLD AUTO: 0.48 X10(3) UL (ref 0.1–1)
MONOCYTES NFR BLD AUTO: 10.9 %
NEUTROPHILS # BLD AUTO: 2.43 X10 (3) UL (ref 1.5–7.7)
NEUTROPHILS # BLD AUTO: 2.43 X10(3) UL (ref 1.5–7.7)
NEUTROPHILS NFR BLD AUTO: 55.2 %
NONHDLC SERPL-MCNC: 92 MG/DL (ref ?–130)
OSMOLALITY SERPL CALC.SUM OF ELEC: 287 MOSM/KG (ref 275–295)
PLATELET # BLD AUTO: 238 10(3)UL (ref 150–450)
POTASSIUM SERPL-SCNC: 5.4 MMOL/L (ref 3.5–5.1)
PROT SERPL-MCNC: 7.1 G/DL (ref 6.4–8.2)
RBC # BLD AUTO: 4.67 X10(6)UL
SODIUM SERPL-SCNC: 138 MMOL/L (ref 136–145)
TRIGL SERPL-MCNC: 47 MG/DL (ref 30–149)
VIT D+METAB SERPL-MCNC: 31.8 NG/ML (ref 30–100)
VLDLC SERPL CALC-MCNC: 7 MG/DL (ref 0–30)
WBC # BLD AUTO: 4.4 X10(3) UL (ref 4–11)

## 2022-09-30 PROCEDURE — 80061 LIPID PANEL: CPT

## 2022-09-30 PROCEDURE — 80053 COMPREHEN METABOLIC PANEL: CPT

## 2022-09-30 PROCEDURE — 82306 VITAMIN D 25 HYDROXY: CPT

## 2022-09-30 PROCEDURE — 85025 COMPLETE CBC W/AUTO DIFF WBC: CPT

## 2022-09-30 PROCEDURE — 36415 COLL VENOUS BLD VENIPUNCTURE: CPT

## 2022-10-07 ENCOUNTER — TELEPHONE (OUTPATIENT)
Dept: INTERNAL MEDICINE CLINIC | Facility: CLINIC | Age: 67
End: 2022-10-07

## 2022-10-07 NOTE — TELEPHONE ENCOUNTER
His MWV was cancelled as AMS was out. Pt will call back to r/s, pt has lab results in chart.   Appt needs to be r/s

## 2022-10-18 ENCOUNTER — OFFICE VISIT (OUTPATIENT)
Dept: INTERNAL MEDICINE CLINIC | Facility: CLINIC | Age: 67
End: 2022-10-18
Payer: MEDICARE

## 2022-10-18 VITALS
BODY MASS INDEX: 27.43 KG/M2 | SYSTOLIC BLOOD PRESSURE: 128 MMHG | DIASTOLIC BLOOD PRESSURE: 66 MMHG | WEIGHT: 162.63 LBS | HEIGHT: 64.57 IN | OXYGEN SATURATION: 98 % | HEART RATE: 61 BPM

## 2022-10-18 DIAGNOSIS — E78.00 PURE HYPERCHOLESTEROLEMIA: ICD-10-CM

## 2022-10-18 DIAGNOSIS — F41.9 ANXIETY: ICD-10-CM

## 2022-10-18 DIAGNOSIS — R93.1 ELEVATED CORONARY ARTERY CALCIUM SCORE: ICD-10-CM

## 2022-10-18 DIAGNOSIS — E04.1 THYROID NODULE: ICD-10-CM

## 2022-10-18 DIAGNOSIS — Z00.00 ENCOUNTER FOR ANNUAL HEALTH EXAMINATION: Primary | ICD-10-CM

## 2022-10-18 PROCEDURE — G0439 PPPS, SUBSEQ VISIT: HCPCS | Performed by: FAMILY MEDICINE

## 2022-10-18 PROCEDURE — 90715 TDAP VACCINE 7 YRS/> IM: CPT | Performed by: FAMILY MEDICINE

## 2022-10-18 PROCEDURE — 99213 OFFICE O/P EST LOW 20 MIN: CPT | Performed by: FAMILY MEDICINE

## 2022-10-18 PROCEDURE — G0008 ADMIN INFLUENZA VIRUS VAC: HCPCS | Performed by: FAMILY MEDICINE

## 2022-10-18 PROCEDURE — 1126F AMNT PAIN NOTED NONE PRSNT: CPT | Performed by: FAMILY MEDICINE

## 2022-10-18 PROCEDURE — 90471 IMMUNIZATION ADMIN: CPT | Performed by: FAMILY MEDICINE

## 2022-10-18 PROCEDURE — 90662 IIV NO PRSV INCREASED AG IM: CPT | Performed by: FAMILY MEDICINE

## 2022-10-18 RX ORDER — LATANOPROST 50 UG/ML
1 SOLUTION/ DROPS OPHTHALMIC NIGHTLY
COMMUNITY
Start: 2022-10-08

## 2022-10-18 RX ORDER — ATORVASTATIN CALCIUM 40 MG/1
40 TABLET, FILM COATED ORAL NIGHTLY
Qty: 90 TABLET | Refills: 0 | Status: SHIPPED
Start: 2022-10-18

## 2022-11-04 ENCOUNTER — PATIENT MESSAGE (OUTPATIENT)
Dept: INTERNAL MEDICINE CLINIC | Facility: CLINIC | Age: 67
End: 2022-11-04

## 2022-11-07 RX ORDER — TIMOLOL MALEATE 5 MG/ML
1 SOLUTION/ DROPS OPHTHALMIC 2 TIMES DAILY
COMMUNITY

## 2022-11-07 NOTE — TELEPHONE ENCOUNTER
Advised if symptoms continue to schedule visit for EKG to be completed. I am unaware of this being related to eye drops recently started. Patient denies symptoms.

## 2022-11-15 ENCOUNTER — PATIENT MESSAGE (OUTPATIENT)
Dept: INTERNAL MEDICINE CLINIC | Facility: CLINIC | Age: 67
End: 2022-11-15

## 2022-11-15 ENCOUNTER — TELEPHONE (OUTPATIENT)
Dept: INTERNAL MEDICINE CLINIC | Facility: CLINIC | Age: 67
End: 2022-11-15

## 2022-11-15 RX ORDER — LATANOPROST 50 UG/ML
SOLUTION/ DROPS OPHTHALMIC
COMMUNITY
Start: 2022-11-13

## 2022-11-15 RX ORDER — BRIMONIDINE TARTRATE 2 MG/ML
SOLUTION/ DROPS OPHTHALMIC
COMMUNITY
Start: 2022-11-14

## 2022-11-15 NOTE — TELEPHONE ENCOUNTER
Eye doc changed mediation due to heart rate changes. Since change of medication patient reports back to baseline.

## 2022-11-17 ENCOUNTER — HOSPITAL ENCOUNTER (OUTPATIENT)
Dept: ULTRASOUND IMAGING | Facility: HOSPITAL | Age: 67
Discharge: HOME | End: 2022-11-17
Attending: FAMILY MEDICINE

## 2022-11-17 DIAGNOSIS — Z13.9 ENCOUNTER FOR SCREENING: ICD-10-CM

## 2022-11-22 DIAGNOSIS — E78.00 PURE HYPERCHOLESTEROLEMIA: ICD-10-CM

## 2022-11-22 RX ORDER — ATORVASTATIN CALCIUM 40 MG/1
40 TABLET, FILM COATED ORAL NIGHTLY
Qty: 90 TABLET | Refills: 3 | Status: SHIPPED | OUTPATIENT
Start: 2022-11-22

## 2022-12-15 RX ORDER — ALPRAZOLAM 0.5 MG/1
0.5 TABLET ORAL
Qty: 30 TABLET | Refills: 0 | Status: SHIPPED | OUTPATIENT
Start: 2022-12-15

## 2022-12-15 NOTE — TELEPHONE ENCOUNTER
Last visit- 10/18/2022 cpe     Last refill- 03/19/2020 alprazolam 0.5mg QTY30 0R    Last labs- 09/30/2022 vitamin d, psa screen, cbc, lipid, cmp     No future appointments.

## 2023-02-06 ENCOUNTER — HOSPITAL ENCOUNTER (OUTPATIENT)
Dept: ULTRASOUND IMAGING | Age: 68
Discharge: HOME | End: 2023-02-06
Attending: FAMILY MEDICINE
Payer: MEDICARE

## 2023-02-06 ENCOUNTER — OFFICE VISIT (OUTPATIENT)
Dept: INTERNAL MEDICINE CLINIC | Facility: CLINIC | Age: 68
End: 2023-02-06
Payer: MEDICARE

## 2023-02-06 VITALS
HEIGHT: 64.57 IN | SYSTOLIC BLOOD PRESSURE: 126 MMHG | BODY MASS INDEX: 27.8 KG/M2 | DIASTOLIC BLOOD PRESSURE: 64 MMHG | WEIGHT: 164.81 LBS | OXYGEN SATURATION: 99 % | HEART RATE: 60 BPM

## 2023-02-06 DIAGNOSIS — M79.662 PAIN OF LEFT CALF: ICD-10-CM

## 2023-02-06 DIAGNOSIS — M79.662 PAIN OF LEFT CALF: Primary | ICD-10-CM

## 2023-02-06 PROCEDURE — 99213 OFFICE O/P EST LOW 20 MIN: CPT | Performed by: FAMILY MEDICINE

## 2023-02-06 PROCEDURE — 93971 EXTREMITY STUDY: CPT | Performed by: FAMILY MEDICINE

## 2023-02-14 NOTE — PATIENT INSTRUCTIONS
Get your high-dose flu shot either here in mid-October or if you see it is available at a pharmacy before that time (stock is low everywhere). We will do your Pneumovax here some time in October.        Jorge Leonard's SCREENING SCHEDULE   Tests on this following criteria:   • Men who are 73-68 years old and have smoked more than 100 cigarettes in their lifetime   • Anyone with a family history    Colorectal Cancer Screening Covered up to Age 76     Colonoscopy Screen   Covered every 10 years- more often End-stage renal disease   Hemophiliacs who received Factor VIII or IX concentrates   Clients of institutions for the mentally retarded   Persons who live in the same house as a HepB virus carrier   Homosexual men   Illicit injectable drug abusers     Tet Detail Level: Zone Include Z78.9 (Other Specified Conditions Influencing Health Status) As An Associated Diagnosis?: No Show Spray Paint Technique Variable?: Yes Post-Care Instructions: I reviewed with the patient in detail post-care instructions. Patient is to wear sunprotection, and avoid picking at any of the treated lesions. Pt may apply Vaseline to crusted or scabbing areas. Spray Paint Text: The liquid nitrogen was applied to the skin utilizing a spray paint frosting technique. Consent: The patient's consent was obtained including but not limited to risks of crusting, scabbing, blistering, scarring, darker or lighter pigmentary change, recurrence, incomplete removal and infection. Medical Necessity Clause: This procedure was medically necessary because the lesions that were treated were: Medical Necessity Information: It is in your best interest to select a reason for this procedure from the list below. All of these items fulfill various CMS LCD requirements except the new and changing color options. Duration Of Freeze Thaw-Cycle (Seconds): 0

## 2023-02-21 ENCOUNTER — LAB ENCOUNTER (OUTPATIENT)
Dept: LAB | Age: 68
End: 2023-02-21
Attending: FAMILY MEDICINE
Payer: MEDICARE

## 2023-02-21 DIAGNOSIS — E78.00 PURE HYPERCHOLESTEROLEMIA: ICD-10-CM

## 2023-02-21 LAB
ALBUMIN SERPL-MCNC: 4.1 G/DL (ref 3.4–5)
ALBUMIN/GLOB SERPL: 1.5 {RATIO} (ref 1–2)
ALP LIVER SERPL-CCNC: 83 U/L
ALT SERPL-CCNC: 24 U/L
ANION GAP SERPL CALC-SCNC: 1 MMOL/L (ref 0–18)
AST SERPL-CCNC: 35 U/L (ref 15–37)
BILIRUB SERPL-MCNC: 0.4 MG/DL (ref 0.1–2)
BUN BLD-MCNC: 19 MG/DL (ref 7–18)
CALCIUM BLD-MCNC: 9.1 MG/DL (ref 8.5–10.1)
CHLORIDE SERPL-SCNC: 106 MMOL/L (ref 98–112)
CHOLEST SERPL-MCNC: 139 MG/DL (ref ?–200)
CO2 SERPL-SCNC: 30 MMOL/L (ref 21–32)
CREAT BLD-MCNC: 1.09 MG/DL
FASTING PATIENT LIPID ANSWER: YES
FASTING STATUS PATIENT QL REPORTED: YES
GFR SERPLBLD BASED ON 1.73 SQ M-ARVRAT: 74 ML/MIN/1.73M2 (ref 60–?)
GLOBULIN PLAS-MCNC: 2.7 G/DL (ref 2.8–4.4)
GLUCOSE BLD-MCNC: 94 MG/DL (ref 70–99)
HDLC SERPL-MCNC: 80 MG/DL (ref 40–59)
LDLC SERPL CALC-MCNC: 49 MG/DL (ref ?–100)
NONHDLC SERPL-MCNC: 59 MG/DL (ref ?–130)
OSMOLALITY SERPL CALC.SUM OF ELEC: 286 MOSM/KG (ref 275–295)
POTASSIUM SERPL-SCNC: 4.3 MMOL/L (ref 3.5–5.1)
PROT SERPL-MCNC: 6.8 G/DL (ref 6.4–8.2)
SODIUM SERPL-SCNC: 137 MMOL/L (ref 136–145)
TRIGL SERPL-MCNC: 44 MG/DL (ref 30–149)
VLDLC SERPL CALC-MCNC: 6 MG/DL (ref 0–30)

## 2023-02-21 PROCEDURE — 36415 COLL VENOUS BLD VENIPUNCTURE: CPT

## 2023-02-21 PROCEDURE — 80053 COMPREHEN METABOLIC PANEL: CPT

## 2023-02-21 PROCEDURE — 80061 LIPID PANEL: CPT

## 2023-04-18 RX ORDER — ESCITALOPRAM OXALATE 10 MG/1
10 TABLET ORAL DAILY
Qty: 90 TABLET | Refills: 2 | Status: SHIPPED | OUTPATIENT
Start: 2023-04-18

## 2023-04-18 NOTE — TELEPHONE ENCOUNTER
Last visit- 02/06/2023 pain of left calf    Last refill- 09/06/2022 escitalopram 10mg QTY90 1R    Last labs- 02/21/2023 lipid, cmp    No future appointments.

## 2023-05-12 DIAGNOSIS — E78.00 PURE HYPERCHOLESTEROLEMIA: ICD-10-CM

## 2023-05-15 RX ORDER — ATORVASTATIN CALCIUM 40 MG/1
40 TABLET, FILM COATED ORAL NIGHTLY
Qty: 90 TABLET | Refills: 2 | Status: SHIPPED | OUTPATIENT
Start: 2023-05-15

## 2023-05-18 DIAGNOSIS — E78.00 PURE HYPERCHOLESTEROLEMIA: ICD-10-CM

## 2023-05-18 RX ORDER — BRIMONIDINE TARTRATE 2 MG/ML
SOLUTION/ DROPS OPHTHALMIC
Refills: 0 | Status: CANCELLED | OUTPATIENT
Start: 2023-05-18

## 2023-05-19 RX ORDER — ATORVASTATIN CALCIUM 40 MG/1
40 TABLET, FILM COATED ORAL NIGHTLY
Qty: 90 TABLET | Refills: 2 | Status: SHIPPED | OUTPATIENT
Start: 2023-05-19

## 2023-05-19 NOTE — TELEPHONE ENCOUNTER
Please call patient. He requested brimonidine eye drops refill from our office but I don't prescribe that for him- his eye doctor does. He should call his eye doctor for that refill.

## 2023-07-19 ENCOUNTER — TELEPHONE (OUTPATIENT)
Dept: INTERNAL MEDICINE CLINIC | Facility: CLINIC | Age: 68
End: 2023-07-19

## 2023-07-19 DIAGNOSIS — Z01.818 PREOPERATIVE EXAMINATION: Primary | ICD-10-CM

## 2023-07-19 DIAGNOSIS — M75.101 TEAR OF RIGHT ROTATOR CUFF, UNSPECIFIED TEAR EXTENT, UNSPECIFIED WHETHER TRAUMATIC: ICD-10-CM

## 2023-07-19 NOTE — TELEPHONE ENCOUNTER
Patient calling to set up appointment    Future Appointments   Date Time Provider Elisha Montoya   10/9/2023  9:30 AM Armond Sargent DO EMG [address]        Surgery with Dr Jovani Morley Oct 27 right rotatory cuff  Sending paperwork to surgeons office      Paperwork is in [facility] folder above desk

## 2023-07-21 NOTE — TELEPHONE ENCOUNTER
Please call patient to schedule pre-op appt with me for sometime between 9/28/23 and 10/14/23. He needs EKG prior. Order is in at [facility]. Surgery is 10/27/23. 15 mins.

## 2023-08-13 DIAGNOSIS — E78.00 PURE HYPERCHOLESTEROLEMIA: ICD-10-CM

## 2023-08-14 RX ORDER — ATORVASTATIN CALCIUM 40 MG/1
40 TABLET, FILM COATED ORAL NIGHTLY
Qty: 90 TABLET | Refills: 2 | Status: SHIPPED | OUTPATIENT
Start: 2023-08-14

## 2023-08-14 NOTE — TELEPHONE ENCOUNTER
Appt scheduled 10/9/23    Cholesterol Medication Protocol [id number]/13/2023 09:11 AM   Protocol Details ALT < 80    ALT resulted within past year    Lipid panel within past 12 months    Appointment within past 12 or next 3 months       Last lipid and CMP  2/21/23

## 2023-10-05 ENCOUNTER — EKG ENCOUNTER (OUTPATIENT)
Dept: LAB | Facility: HOSPITAL | Age: 68
End: 2023-10-05
Attending: FAMILY MEDICINE
Payer: MEDICARE

## 2023-10-05 DIAGNOSIS — Z01.818 PREOPERATIVE EXAMINATION: ICD-10-CM

## 2023-10-05 DIAGNOSIS — M75.101 TEAR OF RIGHT ROTATOR CUFF, UNSPECIFIED TEAR EXTENT, UNSPECIFIED WHETHER TRAUMATIC: ICD-10-CM

## 2023-10-05 LAB
ALBUMIN SERPL-MCNC: 3.8 G/DL (ref 3.4–5)
ALBUMIN/GLOB SERPL: 1.3 {RATIO} (ref 1–2)
ALP LIVER SERPL-CCNC: 81 U/L
ALT SERPL-CCNC: 26 U/L
ANION GAP SERPL CALC-SCNC: 2 MMOL/L (ref 0–18)
AST SERPL-CCNC: 31 U/L (ref 15–37)
ATRIAL RATE: 49 BPM
BILIRUB SERPL-MCNC: 0.5 MG/DL (ref 0.1–2)
BUN BLD-MCNC: 19 MG/DL (ref 7–18)
CALCIUM BLD-MCNC: 8.8 MG/DL (ref 8.5–10.1)
CHLORIDE SERPL-SCNC: 106 MMOL/L (ref 98–112)
CO2 SERPL-SCNC: 29 MMOL/L (ref 21–32)
CREAT BLD-MCNC: 1.18 MG/DL
EGFRCR SERPLBLD CKD-EPI 2021: 67 ML/MIN/1.73M2 (ref 60–?)
FASTING STATUS PATIENT QL REPORTED: YES
GLOBULIN PLAS-MCNC: 3 G/DL (ref 2.8–4.4)
GLUCOSE BLD-MCNC: 96 MG/DL (ref 70–99)
OSMOLALITY SERPL CALC.SUM OF ELEC: 286 MOSM/KG (ref 275–295)
P AXIS: 23 DEGREES
P-R INTERVAL: 208 MS
POTASSIUM SERPL-SCNC: 4.9 MMOL/L (ref 3.5–5.1)
PROT SERPL-MCNC: 6.8 G/DL (ref 6.4–8.2)
Q-T INTERVAL: 454 MS
QRS DURATION: 94 MS
QTC CALCULATION (BEZET): 410 MS
R AXIS: -9 DEGREES
SODIUM SERPL-SCNC: 137 MMOL/L (ref 136–145)
T AXIS: 2 DEGREES
VENTRICULAR RATE: 49 BPM

## 2023-10-05 PROCEDURE — 36415 COLL VENOUS BLD VENIPUNCTURE: CPT

## 2023-10-05 PROCEDURE — 93010 ELECTROCARDIOGRAM REPORT: CPT | Performed by: INTERNAL MEDICINE

## 2023-10-05 PROCEDURE — 93005 ELECTROCARDIOGRAM TRACING: CPT

## 2023-10-05 PROCEDURE — 80053 COMPREHEN METABOLIC PANEL: CPT

## 2023-10-09 ENCOUNTER — OFFICE VISIT (OUTPATIENT)
Dept: INTERNAL MEDICINE CLINIC | Facility: CLINIC | Age: 68
End: 2023-10-09
Payer: MEDICARE

## 2023-10-09 VITALS
HEIGHT: 64.57 IN | SYSTOLIC BLOOD PRESSURE: 128 MMHG | HEART RATE: 78 BPM | BODY MASS INDEX: 28 KG/M2 | WEIGHT: 166 LBS | OXYGEN SATURATION: 98 % | DIASTOLIC BLOOD PRESSURE: 66 MMHG | RESPIRATION RATE: 16 BRPM

## 2023-10-09 DIAGNOSIS — Z01.818 PREOPERATIVE EXAMINATION: Primary | ICD-10-CM

## 2023-10-09 DIAGNOSIS — M25.811 SHOULDER IMPINGEMENT, RIGHT: ICD-10-CM

## 2023-10-09 DIAGNOSIS — M75.101 TEAR OF RIGHT ROTATOR CUFF, UNSPECIFIED TEAR EXTENT, UNSPECIFIED WHETHER TRAUMATIC: ICD-10-CM

## 2023-10-09 PROCEDURE — 99214 OFFICE O/P EST MOD 30 MIN: CPT | Performed by: FAMILY MEDICINE

## 2023-10-09 PROCEDURE — 90662 IIV NO PRSV INCREASED AG IM: CPT | Performed by: FAMILY MEDICINE

## 2023-10-09 PROCEDURE — G0008 ADMIN INFLUENZA VIRUS VAC: HCPCS | Performed by: FAMILY MEDICINE

## 2023-10-09 RX ORDER — NITROGLYCERIN 0.4 MG/1
0.8 TABLET SUBLINGUAL
COMMUNITY
Start: 2023-02-14

## 2023-10-09 RX ORDER — CLOBETASOL PROPIONATE 0.5 MG/G
OINTMENT TOPICAL
COMMUNITY
Start: 2023-09-22

## 2023-10-10 NOTE — PROGRESS NOTES
Subjective:   Patient ID: Beau Nieto is a 70year old male. HPI Here for preoperative exam for planned right shoulder arthroscopic rotator cuff repair, subacromial decompression, possible distal clavicle excision and debridement scheduled for 10/27/23 with Dr. Naina Vieira. Patient has tolerated anesthesia in the past without complications. No chest pain/shortness of breath with his regular exercise of running. History/Other:   Past Medical History:   Diagnosis Date    Anxiety     Bradycardia     Essential hypertension     Hypercholesterolemia     Hypertension     Malaise and fatigue     Pelvic pain in male     Prostate enlargement     Shoulder problem     Weakness in L arm    Thyroid nodule      Past Surgical History:   Procedure Laterality Date    APPENDECTOMY  1969    COLONOSCOPY      HERNIA SURGERY  June 2011    OTHER SURGICAL HISTORY  June 2011    Rotator Cuff surgery      Social History     Socioeconomic History    Marital status:    Tobacco Use    Smoking status: Never    Smokeless tobacco: Never    Tobacco comments:     social smoker (cigars)    Vaping Use    Vaping Use: Never used   Substance and Sexual Activity    Alcohol use: Yes     Alcohol/week: 0.0 standard drinks of alcohol     Comment: a few glasses of wine a day     Drug use: No     Family History   Problem Relation Age of Onset    Heart Attack Father         MI    Heart Disease Father     Hypertension Paternal Grandfather     Heart Disease Paternal Grandfather     Cancer Paternal Grandmother         Pancreas    Cancer Maternal Grandfather     Heart Attack Maternal Grandmother         MI    Cancer Other         Stomach   Aunts       Review of Systems   Constitutional:  Negative for chills, fatigue and fever. HENT:  Negative for hearing loss and sore throat. Eyes:  Negative for pain and visual disturbance. Respiratory:  Negative for choking, shortness of breath and wheezing.     Cardiovascular:  Negative for chest pain, palpitations and leg swelling. Gastrointestinal:  Negative for abdominal pain, constipation, diarrhea, nausea and vomiting. Endocrine: Negative for polydipsia, polyphagia and polyuria. Genitourinary:  Negative for dysuria. Musculoskeletal:  Negative for arthralgias and joint swelling. Skin:  Negative for rash. Neurological:  Negative for dizziness, weakness, light-headedness, numbness and headaches. Hematological:  Negative for adenopathy. Does not bruise/bleed easily. Psychiatric/Behavioral:  Negative for dysphoric mood. The patient is not nervous/anxious. Current Outpatient Medications   Medication Sig Dispense Refill    clobetasol 0.05 % External Ointment TAKE 1 EACH (TOPICAL) 2 TIMES PER DAY FOR 7 DAYS APPLY TO AFFECTED AREAS      nitroglycerin 0.4 MG Sublingual SL Tab Place 2 tablets (0.8 mg total) under the tongue. atorvastatin 40 MG Oral Tab Take 1 tablet (40 mg total) by mouth nightly. 90 tablet 2    ESCITALOPRAM 10 MG Oral Tab Take 1 tablet (10 mg total) by mouth daily. 90 tablet 2    ALPRAZolam 0.5 MG Oral Tab Take 1 tablet (0.5 mg total) by mouth daily as needed for Anxiety. 30 tablet 0    brimonidine 0.2 % Ophthalmic Solution       cetirizine 10 MG Oral Tab       aspirin 81 MG Oral Tab Take 1 tablet (81 mg total) by mouth daily. Allergies:No Known Allergies    Objective:   Physical Exam  Vitals reviewed. Constitutional:       Appearance: Normal appearance. He is well-developed. HENT:      Head: Normocephalic and atraumatic. Right Ear: Tympanic membrane, ear canal and external ear normal.      Left Ear: Tympanic membrane, ear canal and external ear normal.      Mouth/Throat:      Pharynx: No posterior oropharyngeal erythema. Eyes:      Conjunctiva/sclera: Conjunctivae normal.   Cardiovascular:      Rate and Rhythm: Normal rate and regular rhythm. Heart sounds: Normal heart sounds.    Pulmonary:      Effort: Pulmonary effort is normal.      Breath sounds: Normal breath sounds. Musculoskeletal:      Cervical back: Normal range of motion and neck supple. Skin:     General: Skin is warm and dry. Neurological:      Mental Status: He is alert and oriented to person, place, and time. Psychiatric:         Behavior: Behavior normal.         Assessment & Plan:   Preoperative examination  (primary encounter diagnosis)  Tear of right rotator cuff, unspecified tear extent, unspecified whether traumatic  Shoulder impingement, right  Reviewed labs and EKG. Abnormal EKG- patient will see Cardiology for cardiac clearance. Await clearance.    Orders Placed This Encounter      High Dose Fluzone 72 yr and older PFS [[id number]]      Meds This Visit:  Requested Prescriptions      No prescriptions requested or ordered in this encounter       Imaging & Referrals:  FLU VACC HIGH DOSE PRSV FREE

## 2023-10-12 ENCOUNTER — TELEPHONE (OUTPATIENT)
Dept: INTERNAL MEDICINE CLINIC | Facility: CLINIC | Age: 68
End: 2023-10-12

## 2023-10-12 ENCOUNTER — PATIENT MESSAGE (OUTPATIENT)
Dept: INTERNAL MEDICINE CLINIC | Facility: CLINIC | Age: 68
End: 2023-10-12

## 2023-10-12 DIAGNOSIS — R94.31 ABNORMAL EKG: Primary | ICD-10-CM

## 2023-10-13 NOTE — TELEPHONE ENCOUNTER
From: Brittni Benz  To: Bagley Chiki  Sent: 10/12/2023 11:55 AM CDT  Subject: EKG cardio surgery clearance Appointment     FYI apparently someone cancelled my appointment with Dr. Stephanie Potts on Monday 10/16 in [place]. However, I was able to schedule an appointment with Dr Delcie Gottron at the [facility] on Monday 10/16 at 8:40 am . Please send order to [facility] as well as EKG findings. Thank you, Carlos Curry.

## 2023-10-13 NOTE — TELEPHONE ENCOUNTER
LOV 10/9/23     \"Reviewed labs and EKG. Abnormal EKG- patient will see Cardiology for cardiac clearance. Await clearance. \"    Referral in place for cardio from 10/6/23     [facility] - Does the pt need a new cardio referral or is that referral still ok? They will be able to see results in epic correct?

## 2023-10-16 NOTE — TELEPHONE ENCOUNTER
Referral placed and faxed to [facility] along with EKG tracing. Confirmation received.

## 2023-10-17 ENCOUNTER — OFFICE VISIT (OUTPATIENT)
Dept: INTERNAL MEDICINE CLINIC | Facility: CLINIC | Age: 68
End: 2023-10-17
Payer: MEDICARE

## 2023-10-17 VITALS
OXYGEN SATURATION: 98 % | HEIGHT: 64 IN | SYSTOLIC BLOOD PRESSURE: 128 MMHG | RESPIRATION RATE: 17 BRPM | BODY MASS INDEX: 28 KG/M2 | DIASTOLIC BLOOD PRESSURE: 82 MMHG | WEIGHT: 164 LBS | TEMPERATURE: 97 F | HEART RATE: 78 BPM

## 2023-10-17 DIAGNOSIS — Z00.00 ENCOUNTER FOR ANNUAL HEALTH EXAMINATION: Primary | ICD-10-CM

## 2023-10-17 DIAGNOSIS — F41.9 ANXIETY: ICD-10-CM

## 2023-10-17 DIAGNOSIS — E78.00 PURE HYPERCHOLESTEROLEMIA: ICD-10-CM

## 2023-10-17 PROCEDURE — 1126F AMNT PAIN NOTED NONE PRSNT: CPT | Performed by: FAMILY MEDICINE

## 2023-10-17 PROCEDURE — G0439 PPPS, SUBSEQ VISIT: HCPCS | Performed by: FAMILY MEDICINE

## 2023-10-18 NOTE — TELEPHONE ENCOUNTER
Manda from [facility] calling back to check status on EKG that needs to be faxed to them.  Pt scheduled for pre-op next week but they want to see EKG asap.  High TE

## 2023-10-18 NOTE — TELEPHONE ENCOUNTER
Please call Dr. Ramon Webster (sp?) office to get cardiac clearance note sent here- we have not received it. Surgery is next week.

## 2023-10-19 NOTE — TELEPHONE ENCOUNTER
Called and spoke w/ pt to verify cardiologist and where pt had apt for cardiac clearance. Called [facility] and spoke with Micheal. Notified we have not received cardiac clearance letter yet. Provided our fax number and asked to have fax say \"Attention triage\". [facility] stated she will send now. Will await fax.

## 2023-10-23 ENCOUNTER — TELEPHONE (OUTPATIENT)
Dept: INTERNAL MEDICINE CLINIC | Facility: CLINIC | Age: 68
End: 2023-10-23

## 2023-10-23 NOTE — TELEPHONE ENCOUNTER
Please fax my H&P (addendum from today), labs, EKG, [facility] cardiac clearance note (all of which are attached to pre-op paperwork in my folder) to appropriate location. I already messaged patient that we received everything needed. Surgery is 10/27/23.

## 2023-11-22 ENCOUNTER — PATIENT MESSAGE (OUTPATIENT)
Dept: INTERNAL MEDICINE CLINIC | Facility: CLINIC | Age: 68
End: 2023-11-22

## 2023-11-22 RX ORDER — ESCITALOPRAM OXALATE 10 MG/1
10 TABLET ORAL DAILY
Qty: 90 TABLET | Refills: 2 | OUTPATIENT
Start: 2023-11-22

## 2023-11-25 NOTE — TELEPHONE ENCOUNTER
From: Lela Singh  To: Radha Koenig  Sent: 11/22/2023 6:11 PM CST  Subject: Prescription renewal denial    Not sure why Escitalopram 10 MG was denied. I have 2 left. I noticed my prescription bottle says 0 refill by 12/6 / 2023.

## 2023-11-27 RX ORDER — ESCITALOPRAM OXALATE 10 MG/1
10 TABLET ORAL DAILY
Qty: 90 TABLET | Refills: 2 | Status: SHIPPED | OUTPATIENT
Start: 2023-11-27

## 2023-11-28 ENCOUNTER — LAB ENCOUNTER (OUTPATIENT)
Dept: LAB | Age: 68
End: 2023-11-28
Attending: INTERNAL MEDICINE
Payer: MEDICARE

## 2023-11-28 ENCOUNTER — OFFICE VISIT (OUTPATIENT)
Dept: INTERNAL MEDICINE CLINIC | Facility: CLINIC | Age: 68
End: 2023-11-28
Payer: MEDICARE

## 2023-11-28 VITALS
BODY MASS INDEX: 29.45 KG/M2 | DIASTOLIC BLOOD PRESSURE: 84 MMHG | SYSTOLIC BLOOD PRESSURE: 126 MMHG | WEIGHT: 172.5 LBS | HEIGHT: 64 IN | RESPIRATION RATE: 16 BRPM | HEART RATE: 82 BPM

## 2023-11-28 DIAGNOSIS — Z12.5 SCREENING PSA (PROSTATE SPECIFIC ANTIGEN): ICD-10-CM

## 2023-11-28 DIAGNOSIS — R39.9 LOWER URINARY TRACT SYMPTOMS: ICD-10-CM

## 2023-11-28 DIAGNOSIS — R30.0 DYSURIA: Primary | ICD-10-CM

## 2023-11-28 DIAGNOSIS — K59.00 CONSTIPATION, UNSPECIFIED CONSTIPATION TYPE: ICD-10-CM

## 2023-11-28 LAB
APPEARANCE: CLEAR
BILIRUBIN: NEGATIVE
COMPLEXED PSA SERPL-MCNC: 1.33 NG/ML (ref ?–4)
GLUCOSE (URINE DIPSTICK): NEGATIVE MG/DL
KETONES (URINE DIPSTICK): NEGATIVE MG/DL
LEUKOCYTES: NEGATIVE
MULTISTIX LOT#: NORMAL NUMERIC
NITRITE, URINE: NEGATIVE
OCCULT BLOOD: NEGATIVE
PH, URINE: 5.5 (ref 4.5–8)
PROTEIN (URINE DIPSTICK): NEGATIVE MG/DL
SPECIFIC GRAVITY: <=1.005 (ref 1–1.03)
URINE-COLOR: YELLOW
UROBILINOGEN,SEMI-QN: 0.2 MG/DL (ref 0–1.9)

## 2023-11-28 PROCEDURE — 81003 URINALYSIS AUTO W/O SCOPE: CPT | Performed by: FAMILY MEDICINE

## 2023-11-28 PROCEDURE — 36415 COLL VENOUS BLD VENIPUNCTURE: CPT

## 2023-11-28 PROCEDURE — 99214 OFFICE O/P EST MOD 30 MIN: CPT | Performed by: FAMILY MEDICINE

## 2023-11-29 RX ORDER — TAMSULOSIN HYDROCHLORIDE 0.4 MG/1
0.4 CAPSULE ORAL DAILY
Qty: 30 CAPSULE | Refills: 0 | Status: SHIPPED | OUTPATIENT
Start: 2023-11-29 | End: 2023-12-29

## 2023-11-30 NOTE — PROGRESS NOTES
Subjective:   Patient ID: Derek Ford is a 70year old male. HPI Here with c/o about 4 days of urinary frequency and some pressure as well as intermittent dysuria. Has had some constipation issues since surgery about one month ago. Was taking miralax daily and this helped and so switched to every other day but has had return of constipation. Has been trying to up his water intake. No fever. No low back pain. No urinary incontinence. History/Other:   Past Medical History:   Diagnosis Date    Anxiety     Bradycardia     Essential hypertension     Hypercholesterolemia     Hypertension     Malaise and fatigue     Pelvic pain in male     Prostate enlargement     Shoulder problem     Weakness in L arm    Thyroid nodule        Review of Systems   Constitutional:  Negative for chills and fever. Gastrointestinal:  Negative for abdominal pain and nausea. Genitourinary:  Negative for flank pain and hematuria. Current Outpatient Medications   Medication Sig Dispense Refill    escitalopram 10 MG Oral Tab Take 1 tablet (10 mg total) by mouth daily. 90 tablet 2    clobetasol 0.05 % External Ointment TAKE 1 EACH (TOPICAL) 2 TIMES PER DAY FOR 7 DAYS APPLY TO AFFECTED AREAS      nitroglycerin 0.4 MG Sublingual SL Tab Place 2 tablets (0.8 mg total) under the tongue. atorvastatin 40 MG Oral Tab Take 1 tablet (40 mg total) by mouth nightly. 90 tablet 2    ALPRAZolam 0.5 MG Oral Tab Take 1 tablet (0.5 mg total) by mouth daily as needed for Anxiety. 30 tablet 0    brimonidine 0.2 % Ophthalmic Solution       cetirizine 10 MG Oral Tab       aspirin 81 MG Oral Tab Take 1 tablet (81 mg total) by mouth daily. tamsulosin 0.4 MG Oral Cap Take 1 capsule (0.4 mg total) by mouth daily. 30 capsule 0     Allergies:No Known Allergies    Objective:   Physical Exam  Vitals reviewed. Constitutional:       Appearance: Normal appearance. He is well-developed. HENT:      Head: Normocephalic and atraumatic. Pulmonary:      Effort: Pulmonary effort is normal.   Neurological:      Mental Status: He is alert. Psychiatric:         Mood and Affect: Mood normal.         Behavior: Behavior normal.         Assessment & Plan:   1. Dysuria    2. Lower urinary tract symptoms    3. Screening PSA (prostate specific antigen)    4. Constipation, unspecified constipation type    1-3. Urine dip negative. Check PSA. If normal will start Flomax. Discussed risks/benefits/potential side effects and proper use of medication. Continue hydration. F/u if worsening/fever. 4. Restart daily Miralax and fiber. Maintain good hydration.     Orders Placed This Encounter   Procedures    Urine Dip, auto without Micro    PSA, Total (Screening) [E]       Meds This Visit:  Requested Prescriptions      No prescriptions requested or ordered in this encounter       Imaging & Referrals:  None

## 2024-01-08 ENCOUNTER — TELEPHONE (OUTPATIENT)
Dept: INTERNAL MEDICINE CLINIC | Facility: CLINIC | Age: 69
End: 2024-01-08

## 2024-01-08 NOTE — TELEPHONE ENCOUNTER
Received fax from [facility] regarding pt visit, placed in AMS bin for review.

## 2024-05-14 DIAGNOSIS — E78.00 PURE HYPERCHOLESTEROLEMIA: ICD-10-CM

## 2024-05-16 RX ORDER — ATORVASTATIN CALCIUM 40 MG/1
40 TABLET, FILM COATED ORAL NIGHTLY
Qty: 90 TABLET | Refills: 1 | Status: SHIPPED | OUTPATIENT
Start: 2024-05-16

## 2024-05-16 NOTE — TELEPHONE ENCOUNTER
Please review. Protocol Failed; No Protocol    Requested Prescriptions   Pending Prescriptions Disp Refills    atorvastatin 40 MG Oral Tab 90 tablet 2     Sig: Take 1 tablet (40 mg total) by mouth nightly.       Cholesterol Medication Protocol Failed - 5/14/2024  1:06 PM        Failed - Lipid panel within past 12 months     Lab Results   Component Value Date    CHOLEST 139 02/21/2023    TRIG 44 02/21/2023    HDL 80 (H) 02/21/2023    LDL 49 02/21/2023    VLDL 6 02/21/2023    TCHDLRATIO 2.10 09/10/2019    NONHDLC 59 02/21/2023             Passed - ALT < 80     Lab Results   Component Value Date    ALT 26 10/05/2023             Passed - ALT resulted within past year        Passed - In person appointment or virtual visit in the past 12 mos or appointment in next 3 mos     Recent Outpatient Visits              5 months ago Dysuria    [facility], [address] [place], [place] [address]Vasquez [place],     Office Visit    7 months ago Encounter for annual health examination    [facility], [address] [place], [place] [address]Vasquez [place], DO    Office Visit    7 months ago Preoperative examination    [facility], [address] [place], [place] [address]V[place],     Office Visit    1 year ago Pain of left calf    [facility], [address] [place], [place] [address]V[place],     Office Visit    1 year ago Encounter for annual health examination    [facility], [address] [place], [place] [address]V[place], DO    Office Visit                               Recent Outpatient Visits              5 months ago Dysuria    [facility], [address] [place], [place] [address]V[place]quez [place],     Office Visit    7 months ago Encounter for annual health examination    [facility], [address] [place], [place] [address]Vas[place], DO    Office Visit    7 months ago Preoperative examination    [facility], [address] [place], [place] [address]Vasquez Anne,      Office Visit    1 year ago Pain of left calf    [facility], [address] [place], [place]ola Eddy,     Office Visit    1 year ago Encounter for annual health examination    [facility], [address], [place]ola Eddy,     Office Visit

## 2024-05-20 ENCOUNTER — APPOINTMENT (OUTPATIENT)
Dept: GENERAL RADIOLOGY | Facility: HOSPITAL | Age: 69
End: 2024-05-20
Attending: EMERGENCY MEDICINE

## 2024-05-20 ENCOUNTER — TELEPHONE (OUTPATIENT)
Dept: INTERNAL MEDICINE CLINIC | Facility: CLINIC | Age: 69
End: 2024-05-20

## 2024-05-20 ENCOUNTER — HOSPITAL ENCOUNTER (EMERGENCY)
Facility: HOSPITAL | Age: 69
Discharge: HOME | End: 2024-05-20
Attending: EMERGENCY MEDICINE

## 2024-05-20 VITALS
TEMPERATURE: 98 F | SYSTOLIC BLOOD PRESSURE: 152 MMHG | HEART RATE: 50 BPM | DIASTOLIC BLOOD PRESSURE: 80 MMHG | RESPIRATION RATE: 18 BRPM | OXYGEN SATURATION: 99 % | WEIGHT: 171.94 LBS | BODY MASS INDEX: 30 KG/M2

## 2024-05-20 DIAGNOSIS — S22.32XA CLOSED FRACTURE OF ONE RIB OF LEFT SIDE, INITIAL ENCOUNTER: Primary | ICD-10-CM

## 2024-05-20 PROCEDURE — 99284 EMERGENCY DEPT VISIT MOD MDM: CPT

## 2024-05-20 PROCEDURE — 99283 EMERGENCY DEPT VISIT LOW MDM: CPT

## 2024-05-20 PROCEDURE — 71101 X-RAY EXAM UNILAT RIBS/CHEST: CPT | Performed by: EMERGENCY MEDICINE

## 2024-05-20 RX ORDER — NAPROXEN 500 MG/1
500 TABLET ORAL 2 TIMES DAILY PRN
Qty: 20 TABLET | Refills: 0 | Status: SHIPPED | OUTPATIENT
Start: 2024-05-20 | End: 2024-05-30

## 2024-05-20 NOTE — ED PROVIDER NOTES
Patient Seen in: [facility] Emergency Department      History     Chief Complaint   Patient presents with    Fall     FALL ON SATURDAY, TRIPPED HIT HEAD ON CONCRETE/BLACK TOP, COMPLAINS OF LEFT SIDED RIB INJURY WITH PAIN.      Stated Complaint: Fall/ rib injury    Subjective:   HPI    68-year-old male with a past medical history as below presents after he tripped while running and fell 2 days ago.  Patient states he landed directly on his left chest area and has had pain with movement such as bending or twisting along with taking a deep breath.  He denies feeling short of breath.  States he hit his right eyebrow area on the ground but denies any bruising or swelling over that area.  He denies LOC, nausea, vomiting, dizziness or visual changes.  Denies headache.  Denies any other injury from the fall.    Objective:   Past Medical History:    Anxiety    Bradycardia    Essential hypertension    Hypercholesterolemia    Hypertension    Malaise and fatigue    Pelvic pain in male    Prostate enlargement    Shoulder problem    Weakness in L arm    Thyroid nodule              Past Surgical History:   Procedure Laterality Date    Appendectomy  1969    Colonoscopy      Hernia surgery  June 2011    Other surgical history  June 2011    Rotator Cuff surgery                 Social History     Socioeconomic History    Marital status:    Tobacco Use    Smoking status: Never    Smokeless tobacco: Never    Tobacco comments:     social smoker (cigars)    Vaping Use    Vaping status: Never Used   Substance and Sexual Activity    Alcohol use: Yes     Alcohol/week: 0.0 standard drinks of alcohol     Comment: a few glasses of wine a day     Drug use: No              Review of Systems    Positive for stated complaint: Fall/ rib injury  Other systems are as noted in HPI.  Constitutional and vital signs reviewed.      All other systems reviewed and negative except as noted above.    Physical Exam     ED Triage Vitals [05/20/24  0851]   BP (!) 167/82   Pulse 52   Resp 18   Temp 98.1 °F (36.7 °C)   Temp src Temporal   SpO2 99 %   O2 Device None (Room air)       Current Vitals:   Vital Signs  BP: (!) 167/82  Pulse: 52  Resp: 18  Temp: 98.1 °F (36.7 °C)  Temp src: Temporal    Oxygen Therapy  SpO2: 99 %  O2 Device: None (Room air)            Physical Exam  Vitals and nursing note reviewed.   Constitutional:       General: He is not in acute distress.     Appearance: He is well-developed. He is not ill-appearing.   HENT:      Head: Normocephalic and atraumatic.        Mouth/Throat:      Mouth: Mucous membranes are moist.   Eyes:      General: No scleral icterus.     Extraocular Movements: Extraocular movements intact.   Cardiovascular:      Rate and Rhythm: Normal rate and regular rhythm.   Pulmonary:      Effort: No respiratory distress.      Breath sounds: Normal breath sounds.      Comments: Mildly splinting respirations  Chest:       Musculoskeletal:      Cervical back: Neck supple.   Skin:     General: Skin is warm and dry.      Capillary Refill: Capillary refill takes less than 2 seconds.   Neurological:      Mental Status: He is alert and oriented to person, place, and time.      GCS: GCS eye subscore is 4. GCS verbal subscore is 5. GCS motor subscore is 6.   Psychiatric:         Mood and Affect: Mood normal.         Behavior: Behavior normal.               ED Course   Labs Reviewed - No data to display          XR RIBS WITH CHEST (3 VIEWS), LEFT  (CPT=71101)    Result Date: 5/20/2024  CONCLUSION:  Mildly displaced fracture of posterolateral left 5th rib.  No evidence of active cardiopulmonary disease.   LOCATION:  [facility]     Dictated by (CST): Lauro Llanos MD on 5/20/2024 at 10:10 AM     Finalized by (CST): Lauro Llanos MD on 5/20/2024 at 10:12 AM               MDM   68-year-old male with a past medical history as below presents after he tripped while running and fell 2 days ago.     Differential includes but is not limited to chest  wall contusion, rib fracture, pneumothorax, minor head injury.  CT brain was considered however patient denies any headache and has normal neurologic examination 2 days after head injury.  Risk/benefits CT discussed normal deferred at this time.    Independent interpretation of left rib x-rays fifth rib fracture with no pneumothorax..  Radiology report reviewed as above.    Will DC home with Rx for naproxen.  Instructed to apply over-the-counter lidocaine patches to area as well.  Return precaution discussed.      Medical Decision Making  Amount and/or Complexity of Data Reviewed  Radiology: ordered and independent interpretation performed. Decision-making details documented in ED Course.    Risk  Prescription drug management.        Disposition and Plan     Clinical Impression:  1. Closed fracture of one rib of left side, initial encounter         Disposition:  Discharge  5/20/2024 10:22 am    Follow-up:  No follow-up provider specified.        Medications Prescribed:  Current Discharge Medication List        START taking these medications    Details   naproxen 500 MG Oral Tab Take 1 tablet (500 mg total) by mouth 2 (two) times daily as needed.  Qty: 20 tablet, Refills: 0

## 2024-05-20 NOTE — TELEPHONE ENCOUNTER
PSR lead spoke with Dr Juan Leon and Dr Juan Leon advised for patient to go the the  for evaluation. Patient verbalized understanding and agreeable to plan.

## 2024-05-20 NOTE — ED INITIAL ASSESSMENT (HPI)
Fall Saturday onto left side. Hit head and left ribs. No LOC. Takes 81mg ASA daily, no other thinners. Felt \"winded\" No bruising noted to ribs/flank.

## 2024-05-20 NOTE — TELEPHONE ENCOUNTER
Patient fell Saturday morning he didn't seek UC stated he believes he cracked his rib. Left side pain. He is in so much pain he is not able to lay down, he slept in a chair. Patient is in the waiting room.     Message sent to nurse triage in the office.

## 2024-05-23 ENCOUNTER — PATIENT OUTREACH (OUTPATIENT)
Dept: CASE MANAGEMENT | Age: 69
End: 2024-05-23

## 2024-05-23 NOTE — PROGRESS NOTES
1st attempt ER f/up apt request  PCP -decline, pt stated he does not have his calendar and will call back to schedule  Closing encounter

## 2024-06-07 ENCOUNTER — OFFICE VISIT (OUTPATIENT)
Dept: INTERNAL MEDICINE CLINIC | Facility: CLINIC | Age: 69
End: 2024-06-07
Payer: MEDICARE

## 2024-06-07 DIAGNOSIS — G89.29 CHRONIC LEFT SHOULDER PAIN: ICD-10-CM

## 2024-06-07 DIAGNOSIS — M25.512 CHRONIC LEFT SHOULDER PAIN: ICD-10-CM

## 2024-06-07 DIAGNOSIS — S22.32XD CLOSED FRACTURE OF ONE RIB OF LEFT SIDE WITH ROUTINE HEALING, SUBSEQUENT ENCOUNTER: Primary | ICD-10-CM

## 2024-06-07 PROCEDURE — 99214 OFFICE O/P EST MOD 30 MIN: CPT | Performed by: FAMILY MEDICINE

## 2024-06-07 PROCEDURE — G2211 COMPLEX E/M VISIT ADD ON: HCPCS | Performed by: FAMILY MEDICINE

## 2024-06-09 VITALS
WEIGHT: 169 LBS | OXYGEN SATURATION: 98 % | SYSTOLIC BLOOD PRESSURE: 134 MMHG | BODY MASS INDEX: 28.85 KG/M2 | DIASTOLIC BLOOD PRESSURE: 68 MMHG | HEIGHT: 64 IN | HEART RATE: 56 BPM

## 2024-06-09 NOTE — PROGRESS NOTES
Subjective:   Patient ID: Ge Leonard is a 69 year old male.    HPI Here for f/u from ER on 5/20/24 where he went after falling while running 2 days prior. Patient did not hit head. Hit is left chest and shoulder. Shoulder had already been causing him problems but was getting better. Was found to have mildly displaced fracture left 5th rib. Doing IS. Pain is progressively getting better. Is back to running. Doing PT for shoulder.      History/Other:   Past Medical History:    Anxiety    Bradycardia    Essential hypertension    Hypercholesterolemia    Hypertension    Malaise and fatigue    Pelvic pain in male    Prostate enlargement    Shoulder problem    Weakness in L arm    Thyroid nodule       Review of Systems   Respiratory:  Negative for chest tightness and shortness of breath.    Cardiovascular:  Negative for chest pain and palpitations.   Neurological:  Negative for weakness and numbness.     Current Outpatient Medications   Medication Sig Dispense Refill    atorvastatin 40 MG Oral Tab Take 1 tablet (40 mg total) by mouth nightly. 90 tablet 1    escitalopram 10 MG Oral Tab Take 1 tablet (10 mg total) by mouth daily. 90 tablet 2    ALPRAZolam 0.5 MG Oral Tab Take 1 tablet (0.5 mg total) by mouth daily as needed for Anxiety. 30 tablet 0    cetirizine 10 MG Oral Tab       aspirin 81 MG Oral Tab Take 1 tablet (81 mg total) by mouth daily.      brimonidine 0.2 % Ophthalmic Solution        Allergies:No Known Allergies    Objective:   Physical Exam  Vitals reviewed.   Constitutional:       Appearance: Normal appearance. He is well-developed.   HENT:      Head: Normocephalic and atraumatic.   Cardiovascular:      Rate and Rhythm: Normal rate and regular rhythm.      Heart sounds: Normal heart sounds.   Pulmonary:      Effort: Pulmonary effort is normal.      Breath sounds: Normal breath sounds.   Neurological:      Mental Status: He is alert.   Psychiatric:         Mood and Affect: Mood normal.          Behavior: Behavior normal.         Assessment & Plan:   1. Closed fracture of one rib of left side with routine healing, subsequent encounter    2. Chronic left shoulder pain    1,2. Reviewed ER notes, imaging. Healing well clinically. Continue regular activity. Discussed safety while running as patient reports tripping and falling while running on another occasion.     No orders of the defined types were placed in this encounter.      Meds This Visit:  Requested Prescriptions      No prescriptions requested or ordered in this encounter       Imaging & Referrals:  None

## 2024-08-15 ENCOUNTER — TELEPHONE (OUTPATIENT)
Dept: INTERNAL MEDICINE CLINIC | Facility: CLINIC | Age: 69
End: 2024-08-15

## 2024-09-21 ENCOUNTER — PATIENT MESSAGE (OUTPATIENT)
Dept: INTERNAL MEDICINE CLINIC | Facility: CLINIC | Age: 69
End: 2024-09-21

## 2024-09-21 DIAGNOSIS — Z12.5 SCREENING PSA (PROSTATE SPECIFIC ANTIGEN): ICD-10-CM

## 2024-09-21 DIAGNOSIS — Z00.00 ENCOUNTER FOR ANNUAL HEALTH EXAMINATION: Primary | ICD-10-CM

## 2024-10-21 ENCOUNTER — LAB ENCOUNTER (OUTPATIENT)
Dept: LAB | Age: 69
End: 2024-10-21
Attending: FAMILY MEDICINE
Payer: MEDICARE

## 2024-10-21 DIAGNOSIS — Z00.00 ENCOUNTER FOR ANNUAL HEALTH EXAMINATION: ICD-10-CM

## 2024-10-21 LAB
ALBUMIN SERPL-MCNC: 4.8 G/DL (ref 3.2–4.8)
ALBUMIN/GLOB SERPL: 1.9 {RATIO} (ref 1–2)
ALP LIVER SERPL-CCNC: 96 U/L
ALT SERPL-CCNC: 23 U/L
ANION GAP SERPL CALC-SCNC: 7 MMOL/L (ref 0–18)
AST SERPL-CCNC: 47 U/L (ref ?–34)
BASOPHILS # BLD AUTO: 0.04 X10(3) UL (ref 0–0.2)
BASOPHILS NFR BLD AUTO: 0.8 %
BILIRUB SERPL-MCNC: 0.7 MG/DL (ref 0.2–1.1)
BUN BLD-MCNC: 15 MG/DL (ref 9–23)
CALCIUM BLD-MCNC: 10 MG/DL (ref 8.7–10.4)
CHLORIDE SERPL-SCNC: 105 MMOL/L (ref 98–112)
CHOLEST SERPL-MCNC: 155 MG/DL (ref ?–200)
CO2 SERPL-SCNC: 28 MMOL/L (ref 21–32)
CREAT BLD-MCNC: 1.09 MG/DL
EGFRCR SERPLBLD CKD-EPI 2021: 73 ML/MIN/1.73M2 (ref 60–?)
EOSINOPHIL # BLD AUTO: 0.15 X10(3) UL (ref 0–0.7)
EOSINOPHIL NFR BLD AUTO: 3 %
ERYTHROCYTE [DISTWIDTH] IN BLOOD BY AUTOMATED COUNT: 14.1 %
FASTING PATIENT LIPID ANSWER: YES
FASTING STATUS PATIENT QL REPORTED: YES
GLOBULIN PLAS-MCNC: 2.5 G/DL (ref 2–3.5)
GLUCOSE BLD-MCNC: 101 MG/DL (ref 70–99)
HCT VFR BLD AUTO: 47.7 %
HDLC SERPL-MCNC: 77 MG/DL (ref 40–59)
HGB BLD-MCNC: 15.8 G/DL
IMM GRANULOCYTES # BLD AUTO: 0.01 X10(3) UL (ref 0–1)
IMM GRANULOCYTES NFR BLD: 0.2 %
LDLC SERPL CALC-MCNC: 65 MG/DL (ref ?–100)
LYMPHOCYTES # BLD AUTO: 1.62 X10(3) UL (ref 1–4)
LYMPHOCYTES NFR BLD AUTO: 32.5 %
MCH RBC QN AUTO: 30.6 PG (ref 26–34)
MCHC RBC AUTO-ENTMCNC: 33.1 G/DL (ref 31–37)
MCV RBC AUTO: 92.4 FL
MONOCYTES # BLD AUTO: 0.52 X10(3) UL (ref 0.1–1)
MONOCYTES NFR BLD AUTO: 10.4 %
NEUTROPHILS # BLD AUTO: 2.65 X10 (3) UL (ref 1.5–7.7)
NEUTROPHILS # BLD AUTO: 2.65 X10(3) UL (ref 1.5–7.7)
NEUTROPHILS NFR BLD AUTO: 53.1 %
NONHDLC SERPL-MCNC: 78 MG/DL (ref ?–130)
OSMOLALITY SERPL CALC.SUM OF ELEC: 291 MOSM/KG (ref 275–295)
PLATELET # BLD AUTO: 220 10(3)UL (ref 150–450)
POTASSIUM SERPL-SCNC: 4.2 MMOL/L (ref 3.5–5.1)
PROT SERPL-MCNC: 7.3 G/DL (ref 5.7–8.2)
RBC # BLD AUTO: 5.16 X10(6)UL
SODIUM SERPL-SCNC: 140 MMOL/L (ref 136–145)
TRIGL SERPL-MCNC: 64 MG/DL (ref 30–149)
VLDLC SERPL CALC-MCNC: 10 MG/DL (ref 0–30)
WBC # BLD AUTO: 5 X10(3) UL (ref 4–11)

## 2024-10-21 PROCEDURE — 85025 COMPLETE CBC W/AUTO DIFF WBC: CPT

## 2024-10-21 PROCEDURE — 80061 LIPID PANEL: CPT

## 2024-10-21 PROCEDURE — 36415 COLL VENOUS BLD VENIPUNCTURE: CPT

## 2024-10-21 PROCEDURE — 80053 COMPREHEN METABOLIC PANEL: CPT

## 2024-10-28 ENCOUNTER — OFFICE VISIT (OUTPATIENT)
Dept: INTERNAL MEDICINE CLINIC | Facility: CLINIC | Age: 69
End: 2024-10-28
Payer: MEDICARE

## 2024-10-28 VITALS
HEIGHT: 64 IN | SYSTOLIC BLOOD PRESSURE: 114 MMHG | HEART RATE: 65 BPM | OXYGEN SATURATION: 96 % | WEIGHT: 160.25 LBS | DIASTOLIC BLOOD PRESSURE: 60 MMHG | BODY MASS INDEX: 27.36 KG/M2

## 2024-10-28 DIAGNOSIS — Z12.11 SCREENING FOR MALIGNANT NEOPLASM OF COLON: ICD-10-CM

## 2024-10-28 DIAGNOSIS — E78.00 PURE HYPERCHOLESTEROLEMIA: ICD-10-CM

## 2024-10-28 DIAGNOSIS — Z00.00 ENCOUNTER FOR ANNUAL HEALTH EXAMINATION: Primary | ICD-10-CM

## 2024-10-28 DIAGNOSIS — F41.9 ANXIETY: ICD-10-CM

## 2024-10-28 DIAGNOSIS — E04.1 THYROID NODULE: ICD-10-CM

## 2024-10-28 RX ORDER — ESCITALOPRAM OXALATE 10 MG/1
10 TABLET ORAL DAILY
Qty: 90 TABLET | Refills: 3 | Status: SHIPPED | OUTPATIENT
Start: 2024-10-28

## 2024-10-28 RX ORDER — ATORVASTATIN CALCIUM 40 MG/1
40 TABLET, FILM COATED ORAL NIGHTLY
Qty: 90 TABLET | Refills: 3 | Status: SHIPPED | OUTPATIENT
Start: 2024-10-28

## 2024-10-28 NOTE — PROGRESS NOTES
Subjective:   Ge Leonard is a 69 year old male who presents for a Medicare Subsequent Annual Wellness visit (Pt already had Initial Annual Wellness) and scheduled follow up of multiple significant but stable problems.   1. Encounter for annual health examination (Primary)- doing well. Continues to exercise regularly.  2. Pure hypercholesterolemia  -     taking statin with no issues.   3. Thyroid nodule- follows with ENT.   4. Anxiety- taking Lexapro as prescribed and continues to feel it is effective.   5. Screening for malignant neoplasm of colon  -    due in December.       History/Other:   Fall Risk Assessment:   He has been screened for Falls and is low risk.      Cognitive Assessment:   He had a completely normal cognitive assessment - see flowsheet entries     Functional Ability/Status:   Ge Leonard has a completely normal functional assessment. See flowsheet for details.        Depression Screening (PHQ):  PHQ-2 SCORE: 0  , done 10/21/2024             Advanced Directives:   He does have a Living Will but we do NOT have it on file in Epic.    He has a Power of  for Health Care on file in [employer].  Patient has Advance Care Planning documents but we do not have a copy in EMR. Discussed Advanced Care Planning with patient and instructed patient to get our office a copy to be scanned into EMR.      Patient Active Problem List   Diagnosis    Pure hypercholesterolemia    Thyroid nodule    Anxiety     Allergies:  He has No Known Allergies.    Current Medications:  Outpatient Medications Marked as Taking for the 10/28/24 encounter (Office Visit) with Lloa June, DO   Medication Sig    atorvastatin 40 MG Oral Tab Take 1 tablet (40 mg total) by mouth nightly.    escitalopram 10 MG Oral Tab Take 1 tablet (10 mg total) by mouth daily.    ALPRAZolam 0.5 MG Oral Tab Take 1 tablet (0.5 mg total) by mouth daily as needed for Anxiety.    brimonidine 0.2 % Ophthalmic Solution     cetirizine 10 MG  Oral Tab     aspirin 81 MG Oral Tab Take 1 tablet (81 mg total) by mouth daily.       Medical History:  He  has a past medical history of Anxiety, Bradycardia, Essential hypertension, Hypercholesterolemia, Hypertension, Malaise and fatigue, Pelvic pain in male, Prostate enlargement, Shoulder problem, and Thyroid nodule.  Surgical History:  He  has a past surgical history that includes colonoscopy; appendectomy (); hernia surgery (2011); and other surgical history (2011).   Family History:  His family history includes Cancer in his maternal grandfather, paternal grandmother, and another family member; Diabetes in his father, mother, sister, and sister; Heart Attack in his father and maternal grandmother; Heart Disease in his father and paternal grandfather; Heart Disorder in his father; Hypertension in his paternal grandfather; Obesity in his sister.  Social History:  He  reports that he quit smoking about 35 years ago. His smoking use included cigars and cigarettes. He has a 1.5 pack-year smoking history. He has never used smokeless tobacco. He reports current alcohol use of about 3.0 standard drinks of alcohol per week. He reports that he does not use drugs.    Tobacco:  He smoked tobacco in the past but quit greater than 12 months ago.  Social History     Tobacco Use   Smoking Status Former    Current packs/day: 0.00    Average packs/day: 0.1 packs/day for 15.0 years (1.5 ttl pk-yrs)    Types: Cigars, Cigarettes    Quit date: 1989    Years since quittin.4   Smokeless Tobacco Never   Tobacco Comments    Former smoker-Light Averaged 2-4 smokes every so often.          CAGE Alcohol Screen:   CAGE screening score of 0 on 10/21/2024, showing low risk of alcohol abuse.      Patient Care Team:  Lola June DO as PCP - General    Review of Systems  GENERAL: feels well otherwise  SKIN: denies any unusual skin lesions  EYES: denies blurred vision or double vision  HEENT: denies nasal congestion,  sinus pain or ST  LUNGS: denies shortness of breath with exertion  CARDIOVASCULAR: denies chest pain on exertion  GI: denies abdominal pain, denies heartburn  : 1 per night nocturia, no complaint of urinary incontinence  MUSCULOSKELETAL: denies back pain  NEURO: denies headaches  PSYCHE: denies depression or anxiety  HEMATOLOGIC: denies hx of anemia  ENDOCRINE: denies thyroid history  ALL/ASTHMA: denies hx of allergy or asthma    Objective:   Physical Exam  General Appearance:  Alert, cooperative, no distress, appears stated age   Head:  Normocephalic, without obvious abnormality, atraumatic   Eyes:  PERRL, conjunctiva/corneas clear, EOM's intact, both eyes   Ears:  Normal TM's and external ear canals, both ears   Nose: Nares normal, septum midline, mucosa normal, no drainage or sinus tenderness   Throat: Lips, mucosa, and tongue normal; teeth and gums normal   Neck: Supple, symmetrical, trachea midline, no adenopathy, thyroid: not enlarged, symmetric, no tenderness/mass/nodules, no carotid bruit or JVD   Back:   Symmetric, no curvature, ROM normal, no CVA tenderness   Lungs:   Clear to auscultation bilaterally, respirations unlabored   Chest Wall:  No tenderness or deformity   Heart:  Regular rate and rhythm, S1, S2 normal, no murmur, rub or gallop               Extremities: Extremities normal, atraumatic, no cyanosis or edema   Pulses: 2+ and symmetric   Skin: Skin color, texture, turgor normal, no rashes or lesions   Lymph nodes: Cervical, supraclavicular, and axillary nodes normal   Neurologic: Normal     /60   Pulse 65   Ht 5' 4\" (1.626 m)   Wt 160 lb 4.4 oz (72.7 kg)   SpO2 96%   BMI 27.51 kg/m²  Estimated body mass index is 27.51 kg/m² as calculated from the following:    Height as of this encounter: 5' 4\" (1.626 m).    Weight as of this encounter: 160 lb 4.4 oz (72.7 kg).    Medicare Hearing Assessment:   Hearing Screening    Screening Method: Whisper Test  Whisper Test Result: Pass              Assessment & Plan:   Ge Leonard is a 69 year old male who presents for a Medicare Assessment.     1. Encounter for annual health examination (Primary)  2. Pure hypercholesterolemia  -     Atorvastatin Calcium; Take 1 tablet (40 mg total) by mouth nightly.  Dispense: 90 tablet; Refill: 3  3. Thyroid nodule  4. Anxiety  5. Screening for malignant neoplasm of colon  -     Gastro Referral - In Network  Other orders  -     Escitalopram Oxalate; Take 1 tablet (10 mg total) by mouth daily.  Dispense: 90 tablet; Refill: 3  -     High Dose Fluzone trivalent influenza, 65 yrs+ PFS [73235]    The patient indicates understanding of these issues and agrees to the plan.  Reinforced healthy diet, lifestyle, and exercise.  1. Encounter for annual health examination (Primary)- Reviewed age-appropriate preventive health and safety recommendations with patient. Reviewed lab results. Encouraged regular exercise and healthy eating.   2. Pure hypercholesterolemia  -     Atorvastatin Calcium; Take 1 tablet (40 mg total) by mouth nightly.  Dispense: 90 tablet; Refill: 3  3. Thyroid nodule- f/u ENT.   4. Anxiety- controlled on med. Continue.   5. Screening for malignant neoplasm of colon  -     Gastro Referral - In Network  Other orders  -     Escitalopram Oxalate; Take 1 tablet (10 mg total) by mouth daily.  Dispense: 90 tablet; Refill: 3  -     High Dose Fluzone trivalent influenza, 65 yrs+ PFS [63574]    No follow-ups on file.     Lola June DO, 10/28/2024     Supplementary Documentation:   General Health:  In the past six months, have you lost more than 10 pounds without trying?: (Patient-Rptd) 2 - No  Has your appetite been poor?: (Patient-Rptd) No  Type of Diet: (Patient-Rptd) Balanced  How does the patient maintain a good energy level?: (Patient-Rptd) Appropriate Exercise;Stretching  How would you describe your daily physical activity?: (Patient-Rptd) Heavy  How would you describe your current health state?:  (Patient-Rptd) Good  How do you maintain positive mental well-being?: (Patient-Rptd) Social Interaction;Visiting Friends;Visiting Family  On a scale of 0 to 10, with 0 being no pain and 10 being severe pain, what is your pain level?: (Patient-Rptd) 1 - (Mild)  In the past six months, have you experienced urine leakage?: (Patient-Rptd) 0-No  At any time do you feel concerned for the safety/well-being of yourself and/or your children, in your home or elsewhere?: (Patient-Rptd) No  Have you had any immunizations at another office such as Influenza, Hepatitis B, Tetanus, or Pneumococcal?: (Patient-Rptd) No    Health Maintenance   Topic Date Due    Annual Depression Screening  01/01/2024    COVID-19 Vaccine (7 - 2023-24 season) 09/01/2024    Influenza Vaccine (1) 10/01/2024    Annual Physical  10/17/2024    Colorectal Cancer Screening  12/10/2024    PSA  11/28/2025    Fall Risk Screening (Annual)  Completed    Pneumococcal Vaccine: 65+ Years  Completed    Zoster Vaccines  Completed

## 2024-12-18 ENCOUNTER — LAB ENCOUNTER (OUTPATIENT)
Dept: LAB | Age: 69
End: 2024-12-18
Attending: FAMILY MEDICINE
Payer: MEDICARE

## 2024-12-18 DIAGNOSIS — Z12.5 SCREENING PSA (PROSTATE SPECIFIC ANTIGEN): ICD-10-CM

## 2024-12-18 LAB — COMPLEXED PSA SERPL-MCNC: 0.98 NG/ML (ref ?–4)

## 2024-12-18 PROCEDURE — 36415 COLL VENOUS BLD VENIPUNCTURE: CPT

## 2025-02-25 RX ORDER — BRIMONIDINE TARTRATE 2 MG/ML
SOLUTION/ DROPS OPHTHALMIC
Refills: 0 | OUTPATIENT
Start: 2025-02-25

## 2025-04-10 ENCOUNTER — OFFICE VISIT (OUTPATIENT)
Dept: URGENT CARE | Age: 70
End: 2025-04-10
Payer: MEDICARE

## 2025-04-10 ENCOUNTER — NURSE TRIAGE (OUTPATIENT)
Dept: INTERNAL MEDICINE CLINIC | Facility: CLINIC | Age: 70
End: 2025-04-10

## 2025-04-10 VITALS
SYSTOLIC BLOOD PRESSURE: 144 MMHG | TEMPERATURE: 97.7 F | DIASTOLIC BLOOD PRESSURE: 79 MMHG | RESPIRATION RATE: 16 BRPM | OXYGEN SATURATION: 99 % | HEIGHT: 61 IN | WEIGHT: 160 LBS | BODY MASS INDEX: 30.21 KG/M2 | HEART RATE: 58 BPM

## 2025-04-10 DIAGNOSIS — N30.01 ACUTE CYSTITIS WITH HEMATURIA: ICD-10-CM

## 2025-04-10 DIAGNOSIS — R30.0 DYSURIA: Primary | ICD-10-CM

## 2025-04-10 LAB
POC APPEARANCE, URINE: CLEAR
POC BILIRUBIN, URINE: NEGATIVE
POC BLOOD, URINE: NEGATIVE
POC COLOR, URINE: YELLOW
POC GLUCOSE, URINE: NEGATIVE MG/DL
POC KETONES, URINE: NEGATIVE MG/DL
POC LEUKOCYTES, URINE: NEGATIVE
POC NITRITE,URINE: NEGATIVE
POC PH, URINE: 6 PH
POC PROTEIN, URINE: NEGATIVE MG/DL
POC SPECIFIC GRAVITY, URINE: 1.01
POC UROBILINOGEN, URINE: 0.2 EU/DL

## 2025-04-10 RX ORDER — BRIMONIDINE TARTRATE 2 MG/ML
1 SOLUTION/ DROPS OPHTHALMIC 2 TIMES DAILY
COMMUNITY

## 2025-04-10 RX ORDER — PHENAZOPYRIDINE HYDROCHLORIDE 200 MG/1
200 TABLET, FILM COATED ORAL 3 TIMES DAILY PRN
Qty: 10 TABLET | Refills: 0 | Status: SHIPPED | OUTPATIENT
Start: 2025-04-10 | End: 2025-04-13

## 2025-04-10 RX ORDER — ATORVASTATIN CALCIUM 40 MG/1
40 TABLET, FILM COATED ORAL NIGHTLY
COMMUNITY

## 2025-04-10 RX ORDER — ALPRAZOLAM 0.5 MG/1
0.5 TABLET ORAL DAILY PRN
COMMUNITY
Start: 2023-10-16

## 2025-04-10 NOTE — TELEPHONE ENCOUNTER
Action Requested: Summary for Provider     []  Critical Lab, Recommendations Needed  [] Need Additional Advice  []   FYI    []   Need Orders  [] Need Medications Sent to Pharmacy  []  Other     SUMMARY: Pt will go to WIC[facility]IC when he is back in [place] today.     Reason for call: Urinary Symptoms  Onset: 3-4 days   Reason for Disposition   SEVERE pain with urination     Pain 7/10    Protocols used: Urination Pain - Male-[place]    C/o difficulty with urination   C/o pain and burning with urination. Pain level 7/10   Denies back pain, vomiting and fever  C/o urine dribbling. Stated bladder was feeling full however now feels like he was able to empty bladder after 45 minutes  Currently in [place], [place] and driving back today  Advised I do recommend eval today and not waiting for apt tomorrow. Notified WIC/IC are open to 7:30pm. Pt stated he will go to clinic when he is back in [place]. Feels ok to drive home. Advised to drink plenty of fluids and to ER with fever.   Pt verbalizes understanding and is agreeable to plan.

## 2025-04-10 NOTE — PROGRESS NOTES
"Subjective   Patient ID: Sheldon Henley is a 69 y.o. male. They present today with a chief complaint of UTI (Burning with urination, urge to urinate for 3-4 days).    History of Present Illness    UTI    This is a 69-year-old male presents to urgent care for UTI symptoms.  His symptoms started 3 to 4 days ago.  Endorses urgency and dysuria.  Denies frequency.  He did call nurse hotline this morning.  Does have BPH.  Denies suprapubic discomfort, flank/abdominal pain.  No history of kidney stones.  Denies rectal pain, testicular pain, masses/bulges in the groin, fever, or chills.  Past Medical History  Allergies as of 04/10/2025    (No Known Allergies)       (Not in a hospital admission)       No past medical history on file.    No past surgical history on file.     reports that he has never smoked. He has never used smokeless tobacco. He reports that he does not drink alcohol and does not use drugs.    Review of Systems  Review of Systems   All other systems reviewed and are negative.                                 Objective    Vitals:    04/10/25 1030   BP: 144/79   BP Location: Left arm   Patient Position: Sitting   BP Cuff Size: Small adult   Pulse: 58   Resp: 16   Temp: 36.5 °C (97.7 °F)   TempSrc: Oral   SpO2: 99%   Weight: 72.6 kg (160 lb)   Height: 1.549 m (5' 1\")     No LMP for male patient.    Physical Exam    Physical Exam:  General: Vitals noted, no distress. Afebrile.   EENT: Posterior oropharynx unremarkable.   Cardiac: Regular, rate, rhythm,  Pulmonary: Lungs clear bilaterally with good aeration. No adventitious breath sounds.   Abdomen: Soft, nonsurgical. Nontender. No peritoneal signs.   Back: No CVA tenderness bilaterally   Extremities: No peripheral edema. Neurovascularly intact throughout.  Skin: No rash.   Neuro: No gross neurologic deficits.                    Procedures    Point of Care Test & Imaging Results from this visit  Results for orders placed or performed in visit on 04/10/25   POCT " UA Automated manually resulted   Result Value Ref Range    POC Color, Urine Yellow Straw, Yellow, Light-Yellow    POC Appearance, Urine Clear Clear    POC Glucose, Urine NEGATIVE NEGATIVE mg/dl    POC Bilirubin, Urine NEGATIVE NEGATIVE    POC Ketones, Urine NEGATIVE NEGATIVE mg/dl    POC Specific Gravity, Urine 1.010 1.005 - 1.035    POC Blood, Urine NEGATIVE NEGATIVE    POC PH, Urine 6.0 No Reference Range Established PH    POC Protein, Urine NEGATIVE NEGATIVE mg/dl    POC Urobilinogen, Urine 0.2 0.2, 1.0 EU/DL    Poc Nitrite, Urine NEGATIVE NEGATIVE    POC Leukocytes, Urine NEGATIVE NEGATIVE      Imaging  No results found.    Cardiology, Vascular, and Other Imaging  No other imaging results found for the past 2 days      Diagnostic study results (if any) were reviewed by Brian Sarmiento PA-C.    Assessment/Plan   Allergies, medications, history, and pertinent labs/EKGs/Imaging reviewed by Brian Sarmiento PA-C.     Medical Decision Making  MDM:      Summary: This is a 69-year-old male here for urinary symptoms. Vital signs were reviewed. Patient is well-appearing nontoxic on exam. Differential diagnosis includes but not limited to UTI, pyelonephritis, prostatitis, nephrolithiasis, urinary retention, sepsis.  He is not septic by vital signs.  No CVA tenderness bilaterally.  My concern for pyelonephritis is low.  Patient does feel like he is emptying his bladder.  He is nontender in suprapubic region without distention.  I am not concerned about urinary retention.  His urine dip is unremarkable for infection.  Urine culture sent to lab.  Did give him a short course of Pyridium.  Stable for discharge. Follow-up with PCP/urology. Return to urgent care or go to the emergency department if symptoms worsen or if new symptoms develop.    Orders and Diagnoses  Diagnoses and all orders for this visit:  Dysuria  -     phenazopyridine (Pyridium) 200 mg tablet; Take 1 tablet (200 mg) by mouth 3 times a day as needed for bladder  spasms for up to 3 days.  Acute cystitis with hematuria  -     POCT UA Automated manually resulted  -     Urine Culture      Medical Admin Record      Patient disposition: Home    Electronically signed by Brian Sarmiento PA-C  10:56 AM

## 2025-04-12 LAB — BACTERIA UR CULT: NORMAL

## 2025-04-15 ENCOUNTER — OFFICE VISIT (OUTPATIENT)
Dept: INTERNAL MEDICINE CLINIC | Facility: CLINIC | Age: 70
End: 2025-04-15
Payer: MEDICARE

## 2025-04-15 VITALS
TEMPERATURE: 97 F | DIASTOLIC BLOOD PRESSURE: 74 MMHG | BODY MASS INDEX: 28.83 KG/M2 | WEIGHT: 168.88 LBS | HEART RATE: 72 BPM | OXYGEN SATURATION: 96 % | HEIGHT: 64 IN | SYSTOLIC BLOOD PRESSURE: 128 MMHG | RESPIRATION RATE: 16 BRPM

## 2025-04-15 DIAGNOSIS — R30.0 DYSURIA: ICD-10-CM

## 2025-04-15 DIAGNOSIS — R35.0 URINARY FREQUENCY: Primary | ICD-10-CM

## 2025-04-15 PROCEDURE — 99214 OFFICE O/P EST MOD 30 MIN: CPT | Performed by: FAMILY MEDICINE

## 2025-04-15 PROCEDURE — G2211 COMPLEX E/M VISIT ADD ON: HCPCS | Performed by: FAMILY MEDICINE

## 2025-04-15 RX ORDER — TAMSULOSIN HYDROCHLORIDE 0.4 MG/1
0.4 CAPSULE ORAL DAILY
COMMUNITY
End: 2025-04-15

## 2025-04-15 RX ORDER — TAMSULOSIN HYDROCHLORIDE 0.4 MG/1
0.4 CAPSULE ORAL DAILY
Qty: 90 CAPSULE | Refills: 0 | Status: SHIPPED | OUTPATIENT
Start: 2025-04-15

## 2025-04-17 NOTE — PROGRESS NOTES
Subjective:   Patient ID: Ge Leonard is a 69 year old male.    HPI Here for f/u from urgent care on 4/10/25 where he went with dysuria x 4 days. Given phenazopyridine which hasn't helped. Urine culture negative per patient. Patient had previously used Tamsulosin and had some left over so he restarted this 2 days ago and it has been helping with urinary frequency that he has had as well as increasing urine flow. Continues to have some dysuria and urgency.     History/Other:   Review of Systems   Constitutional:  Negative for chills, fatigue and fever.   Respiratory:  Negative for chest tightness and shortness of breath.    Neurological:  Negative for dizziness and light-headedness.     Current Medications[1]  Allergies:Allergies[2]    Objective:   Physical Exam  Vitals reviewed.   Constitutional:       Appearance: Normal appearance. He is well-developed.   HENT:      Head: Normocephalic and atraumatic.   Pulmonary:      Effort: Pulmonary effort is normal.   Neurological:      Mental Status: He is alert.   Psychiatric:         Mood and Affect: Mood normal.         Behavior: Behavior normal.         Assessment & Plan:   1. Urinary frequency    2. Dysuria    1,2. Reviewed urgent care notes, labs. Ok to continue Tamsulosin but will need to f/u with Urology for further eval.  Referral given.     No orders of the defined types were placed in this encounter.      Meds This Visit:  Requested Prescriptions     Signed Prescriptions Disp Refills    tamsulosin 0.4 MG Oral Cap 90 capsule 0     Sig: Take 1 capsule (0.4 mg total) by mouth in the morning.       Imaging & Referrals:  UROLOGY - INTERNAL         [1]   Current Outpatient Medications   Medication Sig Dispense Refill    tamsulosin 0.4 MG Oral Cap Take 1 capsule (0.4 mg total) by mouth in the morning. 90 capsule 0    atorvastatin 40 MG Oral Tab Take 1 tablet (40 mg total) by mouth nightly. 90 tablet 3    escitalopram 10 MG Oral Tab Take 1 tablet (10 mg total) by  mouth daily. 90 tablet 3    ALPRAZolam 0.5 MG Oral Tab Take 1 tablet (0.5 mg total) by mouth daily as needed for Anxiety. 6 tablet 0    brimonidine 0.2 % Ophthalmic Solution       cetirizine 10 MG Oral Tab       aspirin 81 MG Oral Tab Take 1 tablet (81 mg total) by mouth daily.     [2] No Known Allergies

## 2025-04-23 ENCOUNTER — PATIENT MESSAGE (OUTPATIENT)
Dept: INTERNAL MEDICINE CLINIC | Facility: CLINIC | Age: 70
End: 2025-04-23

## 2025-04-23 NOTE — TELEPHONE ENCOUNTER
4/15/25 Office visit notes     1. Urinary frequency    2. Dysuria    1,2. Reviewed urgent care notes, labs. Ok to continue Tamsulosin but will need to f/u with Urology for further eval.  Referral given.

## 2025-05-05 ENCOUNTER — TELEPHONE (OUTPATIENT)
Age: 70
End: 2025-05-05

## 2025-05-05 DIAGNOSIS — Z13.0 SCREENING FOR DEFICIENCY ANEMIA: ICD-10-CM

## 2025-05-05 DIAGNOSIS — Z13.1 SCREENING FOR DIABETES MELLITUS: ICD-10-CM

## 2025-05-05 DIAGNOSIS — Z00.00 ENCOUNTER FOR ANNUAL HEALTH EXAMINATION: ICD-10-CM

## 2025-05-05 DIAGNOSIS — Z12.5 SCREENING PSA (PROSTATE SPECIFIC ANTIGEN): ICD-10-CM

## 2025-05-05 DIAGNOSIS — Z13.220 SCREENING FOR LIPID DISORDERS: Primary | ICD-10-CM

## 2025-05-05 NOTE — TELEPHONE ENCOUNTER
Patient called request labs prior to their annual physical.  Annual physical scheduled for   Future Appointments   Date Time Provider Department Center   10/31/2025  8:30 AM Lola June,  [facility]      Please order labs. Patient preferred lab is [facility]   Patient informed request was sent to clinical team.  Patient informed to fast for labs.  No callback required.

## 2025-05-06 NOTE — TELEPHONE ENCOUNTER
Labs enter and pending, waiting for approved  Active lab orders placed per [facility] protocol   Medicare wellness visit: 10/31/2025

## 2025-05-09 ENCOUNTER — TELEPHONE (OUTPATIENT)
Age: 70
End: 2025-05-09

## 2025-05-09 DIAGNOSIS — I83.90 VARICOSE VEINS OF LOWER EXTREMITY, UNSPECIFIED LATERALITY, UNSPECIFIED WHETHER COMPLICATED: Primary | ICD-10-CM

## 2025-05-20 ENCOUNTER — OFFICE VISIT (OUTPATIENT)
Facility: CLINIC | Age: 70
End: 2025-05-20
Payer: MEDICARE

## 2025-05-20 ENCOUNTER — OFFICE VISIT (OUTPATIENT)
Dept: SURGERY | Facility: CLINIC | Age: 70
End: 2025-05-20
Payer: MEDICARE

## 2025-05-20 VITALS
BODY MASS INDEX: 28 KG/M2 | HEIGHT: 64 IN | DIASTOLIC BLOOD PRESSURE: 64 MMHG | SYSTOLIC BLOOD PRESSURE: 110 MMHG | WEIGHT: 164 LBS

## 2025-05-20 DIAGNOSIS — K59.00 CONSTIPATION, UNSPECIFIED CONSTIPATION TYPE: ICD-10-CM

## 2025-05-20 DIAGNOSIS — R35.0 URINARY FREQUENCY: Primary | ICD-10-CM

## 2025-05-20 DIAGNOSIS — R30.0 DYSURIA: ICD-10-CM

## 2025-05-20 DIAGNOSIS — I83.812 VARICOSE VEINS OF LEFT LOWER EXTREMITY WITH PAIN: Primary | ICD-10-CM

## 2025-05-20 DIAGNOSIS — R82.90 URINE FINDING: ICD-10-CM

## 2025-05-20 LAB
APPEARANCE: CLEAR
BILIRUBIN: NEGATIVE
GLUCOSE (URINE DIPSTICK): NEGATIVE MG/DL
KETONES (URINE DIPSTICK): NEGATIVE MG/DL
LEUKOCYTES: NEGATIVE
MULTISTIX LOT#: NORMAL NUMERIC
NITRITE, URINE: NEGATIVE
OCCULT BLOOD: NEGATIVE
PH, URINE: 6 (ref 4.5–8)
PROTEIN (URINE DIPSTICK): NEGATIVE MG/DL
SPECIFIC GRAVITY: 1.02 (ref 1–1.03)
URINE-COLOR: YELLOW
UROBILINOGEN,SEMI-QN: 0.2 MG/DL (ref 0–1.9)

## 2025-05-20 PROCEDURE — 99203 OFFICE O/P NEW LOW 30 MIN: CPT

## 2025-05-20 PROCEDURE — 99203 OFFICE O/P NEW LOW 30 MIN: CPT | Performed by: PHYSICIAN ASSISTANT

## 2025-05-20 PROCEDURE — 51798 US URINE CAPACITY MEASURE: CPT | Performed by: PHYSICIAN ASSISTANT

## 2025-05-20 PROCEDURE — 81003 URINALYSIS AUTO W/O SCOPE: CPT | Performed by: PHYSICIAN ASSISTANT

## 2025-05-20 NOTE — PROGRESS NOTES
[facility], [facility]    Urology Consult Note    History of Present Illness:   Patient is a 69 year old male with hx of HTN, HL, anxiety, who presents today for consultation from Dr. June's office for urinary frequency.    Patient notes that he was visiting his mother in [place] when he developed dysuria and increased urinary frequency. UA negative. He was started on abx for possible UTI. He had some left over tamsulosin from post operative partial urinary retention in 2023 which he took and had relief of symptoms.     Since returning to [place] he was seen by PCP who recommended  evaluation to determine need to continue alpha blocker.    Patient notes no baseline urinary complaints. Had difficulties voiding following a shoulder surgery in 2023 that he took tamsulosin for period of time but discontinued after voiding returned to normal.    Happy with voiding habits, no concerns. No gross hematuria. Good FOS.     PVR today 11mL and UA negative    No decongestants  Constipated chronically    No FH of  cancers.     Lab Results   Component Value Date/Time    PSA 1.27 09/17/2021 09:07 AM     Lab Results   Component Value Date    PSAS 0.98 12/18/2024    PSAS 1.33 11/28/2023    PSAS 1.58 09/30/2022    PSAS 1.67 09/27/2019         HISTORY:  Past Medical History[1]   Past Surgical History[2]   Family History[3]   Social History: Short Social Hx on File[4]     Allergies  Allergies[5]    Review of Systems:   A 10-point review of systems was completed and is negative other than as noted above.    Physical Exam:   There were no vitals taken for this visit.    GENERAL APPEARANCE: well developed, well nourished, in no acute distress  NEUROLOGIC: no localizing neurologic signs, alert and oriented x 3, converses appropriately  HEAD: atraumatic, normocephalic  EYES: sclera non-icteric  ORAL CAVITY: mucosa moist  NECK/THYROID: no obvious masses or goiter  LUNGS: non-labored breathing  GO 20g  smooth symmetric without nodule or induration  EXTREMITIES: warm, well-perfused. No clubbing, cyanosis or edema.  SKIN: no obvious rashes    Results:     Laboratory Data:  Lab Results   Component Value Date    WBC 5.0 10/21/2024    HGB 15.8 10/21/2024    .0 10/21/2024     Lab Results   Component Value Date     10/21/2024    K 4.2 10/21/2024     10/21/2024    CO2 28.0 10/21/2024    BUN 15 10/21/2024     (H) 10/21/2024    GFRAA 91 09/17/2021    AST 47 (H) 10/21/2024    ALT 23 10/21/2024    TP 7.3 10/21/2024    ALB 4.8 10/21/2024    CA 10.0 10/21/2024       Urinalysis Results (last three years):  Recent Labs     11/28/23  0910 05/20/25  1212   SPECGRAVITY <=1.005 1.020   PHURINE 5.5 6.0   NITRITE Negative Negative       Urine Culture Results (last three years):  No results found for: \"URINECUL\"    Imaging  No results found.      Impression:     Patient is a 69 year old male with hx of HTN, HL, anxiety, who presents today for consultation from Dr. June's office for urinary frequency.    Reviewed with patient potential contributing factors including constipation, enlarged prostate, decongestants, EtOH use, anesthesia, etc.. His prostate is normal size on examination today. Suspect his recent urinary complaints were related to constipation which was worse while he was traveling.     Residual today minimal and voiding well without complaints.     Recommendations:  Reviewed he may discontinue tamsulosin, if symptoms worsen should resume and follow up with urology.   Follow up prn for any changes.    Thank you very much for this consult. Please call if there are any questions or concerns.     Chanel Parisi PA-C  Urology  [facility]    Date: 5/20/2025          [1]   Past Medical History:   Anxiety    Bradycardia    Essential hypertension    Hypercholesterolemia    Hypertension    Malaise and fatigue    Pelvic pain in male    Prostate enlargement    Shoulder problem    Weakness in  L arm    Thyroid nodule   [2]   Past Surgical History:  Procedure Laterality Date    Appendectomy      Colonoscopy      Hernia surgery  2011    Other surgical history  2011    Rotator Cuff surgery    [3]   Family History  Problem Relation Age of Onset    Heart Attack Father         MI    Heart Disease Father     Diabetes Father         Borderline sugar    Heart Disorder Father         Passed away at 43 Heart Attack    Diabetes Mother         Sugar slightly elevated taking medication    Hypertension Paternal Grandfather          at 42 Heart Attack/High BP    Heart Disease Paternal Grandfather     Cancer Paternal Grandmother         Pancreas    Cancer Maternal Grandfather     Heart Attack Maternal Grandmother         MI    Cancer Other         Stomach   Aunts    Diabetes Sister         Elevated Sugar    Obesity Sister     Diabetes Sister         Elevated Sugar   [4]   Social History  Socioeconomic History    Marital status:    Tobacco Use    Smoking status: Former     Current packs/day: 0.00     Average packs/day: 0.1 packs/day for 15.0 years (1.5 ttl pk-yrs)     Types: Cigars, Cigarettes     Start date:      Quit date: 1989     Years since quittin.9    Smokeless tobacco: Never    Tobacco comments:     Former smoker-Light Averaged 2-4 smokes every so often.   Vaping Use    Vaping status: Never Used   Substance and Sexual Activity    Alcohol use: Yes     Alcohol/week: 3.0 standard drinks of alcohol     Types: 2 Glasses of wine, 1 Cans of beer per week     Comment: above daily average    Drug use: No   Other Topics Concern    Caffeine Concern No    Exercise No    Seat Belt No    Special Diet No    Stress Concern No    Weight Concern No   [5] No Known Allergies

## 2025-05-20 NOTE — PROGRESS NOTES
VASCULAR SURGERY CONSULT NOTE      Ge Leonard   :  1955  MR#  [id number]    REFERRING PHYSICIAN:  Lola June  PRIMARY CARE PHYSICIAN:  Lola June DO    Chief Complaint   Patient presents with    Referral     Referral from Dr. Lola Breen for Left Lower extremity pain and varicose veins .  The patient has pea size cyst in the Left Lower leg wit increased pain with activity.  The patient is active training for a hiking marathon.  He had US that was normal.          HPI:    The patient is a 69 year old male who has been referred to the clinic today for an evaluation of his left lower extremity heaviness, tiredness, edema, and pain after prolonged standing. The pain is reported in his calf and distal leg. This is interfering with his activities of daily living and exercise. He has not worn compression stockings in the recent past. No history of endovenous ablations performed in the past.     PAST MEDICAL HISTORY:   Past Medical History[1]    PAST SURGICAL HISTORY:   Past Surgical History[2]     MEDICATIONS:   Current Medications[3]    ALLERGIES:   Allergies[4]    SOCIAL HISTORY:   Short Social Hx on File[5]     FAMILY HISTORY:   Family History[6]    ROS:   A 12 point review of systems with pertinent positives and negatives listed in the HPI.    PHYSICAL EXAM:   /64 (BP Location: Radial, Patient Position: Sitting)   Ht 5' 4\" (1.626 m)   Wt 164 lb (74.4 kg)   BMI 28.15 kg/m²   GENERAL: alert and orientated X 3, well developed, well nourished, in no apparent distress  HEENT: ears and throat are clear  NECK: supple, no lymphadenopathy, thyroid wnl  CAROTID: No bruits  RESPIRATORY: no rales, rhonchi, or wheezes B  CARDIO: RRR without murmur, no murmur, no gallop   ABDOMEN: soft, non-tender with no palpable aneurysm or masses  BACK: normal, no tenderness  SKIN: no rashes, warm and dry  NEURO/PSYCH: orientated x3, normal mood and affect, no sensory or motor deficit  EXTREMITIES: full  range of motion, no tenderness or edema in either leg.   VASCULAR  Pulse exam right: femoral 2+, DP  2+, PT  2+  Pulse exam left: femoral  2+, DP  2+, PT  2+      Vein Assessment:      Mild scattered left  LE bulgy varicose veins with no significant hemosiderin deposition.     IMPRESSION:   Left  lower extremity pain due to venous insufficiency.   Symptomatic calf varicosities.    PLAN:     We reviewed the options for management of venous insufficiency, including conservative therapy, sclerotherapy, stab phlebectomy, and endovenous laser ablation. The patient was educated in the benign condition of venous disease.  I have given the patient a prescription for Grade II compression stockings (20-30 mmHg, thigh-highs). We reviewed the importance of wearing these daily and consistently. We also reviewed other types of conservative measures, such as periodic leg elevation, walking for exercise, analgesic use, and the avoidance of prolonged standing.  I have sent the patient for a venous reflux ultrasound. Should the ultrasound study reveal venous reflux with dilation and he does not have relief of symptoms with conservative therapy, then endovenous laser ablation may be a possible treatment option.  I explained to the patient that his insurance company may require a 6-12 week trial period of conservative therapy prior to authorizing endovenous ablation treatment.  The patient understood and agreed to proceed with this treatment plan, all of his questions were answered during this clinic visit.       Thank you for allowing to participate in the care of your patient.     CHERYL Leslie  Division of Vascular Surgery.        [1]   Past Medical History:   Anxiety    Bradycardia    Essential hypertension    Hypercholesterolemia    Hypertension    Malaise and fatigue    Pelvic pain in male    Prostate enlargement    Shoulder problem    Weakness in L arm    Thyroid nodule   [2]   Past Surgical History:  Procedure  Laterality Date    Appendectomy      Colonoscopy      Hernia surgery  2011    Other surgical history  2011    Rotator Cuff surgery    [3]   Current Outpatient Medications   Medication Sig Dispense Refill    tamsulosin 0.4 MG Oral Cap Take 1 capsule (0.4 mg total) by mouth in the morning. 90 capsule 0    atorvastatin 40 MG Oral Tab Take 1 tablet (40 mg total) by mouth nightly. 90 tablet 3    escitalopram 10 MG Oral Tab Take 1 tablet (10 mg total) by mouth daily. 90 tablet 3    ALPRAZolam 0.5 MG Oral Tab Take 1 tablet (0.5 mg total) by mouth daily as needed for Anxiety. 6 tablet 0    brimonidine 0.2 % Ophthalmic Solution       cetirizine 10 MG Oral Tab       aspirin 81 MG Oral Tab Take 1 tablet (81 mg total) by mouth daily.     [4] No Known Allergies  [5]   Social History  Socioeconomic History    Marital status:    Tobacco Use    Smoking status: Former     Current packs/day: 0.00     Average packs/day: 0.1 packs/day for 15.0 years (1.5 ttl pk-yrs)     Types: Cigars, Cigarettes     Start date:      Quit date: 1989     Years since quittin.9    Smokeless tobacco: Never    Tobacco comments:     Former smoker-Light Averaged 2-4 smokes every so often.   Vaping Use    Vaping status: Never Used   Substance and Sexual Activity    Alcohol use: Yes     Alcohol/week: 3.0 standard drinks of alcohol     Types: 2 Glasses of wine, 1 Cans of beer per week     Comment: above daily average    Drug use: No   Other Topics Concern    Caffeine Concern No    Exercise No    Seat Belt No    Special Diet No    Stress Concern No    Weight Concern No   [6]   Family History  Problem Relation Age of Onset    Heart Attack Father         MI    Heart Disease Father     Diabetes Father         Borderline sugar    Heart Disorder Father         Passed away at 43 Heart Attack    Diabetes Mother         Sugar slightly elevated taking medication    Hypertension Paternal Grandfather          at 42 Heart Attack/High BP     Heart Disease Paternal Grandfather     Cancer Paternal Grandmother         Pancreas    Cancer Maternal Grandfather     Heart Attack Maternal Grandmother         MI    Cancer Other         Stomach   Aunts    Diabetes Sister         Elevated Sugar    Obesity Sister     Diabetes Sister         Elevated Sugar

## 2025-06-17 ENCOUNTER — NURSE ONLY (OUTPATIENT)
Facility: CLINIC | Age: 70
End: 2025-06-17
Payer: MEDICARE

## 2025-06-17 DIAGNOSIS — I83.812 VARICOSE VEINS OF LEFT LOWER EXTREMITY WITH PAIN: ICD-10-CM

## 2025-07-14 ENCOUNTER — OFFICE VISIT (OUTPATIENT)
Facility: CLINIC | Age: 70
End: 2025-07-14
Payer: MEDICARE

## 2025-07-14 VITALS — SYSTOLIC BLOOD PRESSURE: 116 MMHG | DIASTOLIC BLOOD PRESSURE: 68 MMHG

## 2025-07-14 DIAGNOSIS — I83.812 VARICOSE VEINS OF LEFT LOWER EXTREMITY WITH PAIN: Primary | ICD-10-CM

## 2025-07-14 NOTE — PROGRESS NOTES
Lalita Iverson MD.  Vascular and Endovascular Surgery          VASCULAR SURGERY VISIT NOTE       Ge Leonard   :  1955  MR#  [id number]    REFERRING PHYSICIAN:  Lola June  PRIMARY CARE PHYSICIAN:  Lola June DO      HPI:    The patient is a 70 year old male who is here for follow-up for his left lower focal tenderness on the back of the calf than occasionally bothers him, This does not interfere with his activities of daily living and exercise hat much.  He was seen previously and determined to benefit from a trial of conservative therapy for venous insufficiency diagnosed clinically and after a positive venous reflux study. Conservative measures including periodic leg elevation, walking for exercise, attempts at weight loss, avoiding prolonged standing, and oral analgesics including wearing compression Grade II compression stockings (20-30 mm Hg) for greater than 6 weeks. All of these afforts have all failed to control his symptoms. There has been no change in his medical or surgical history since the last visit.      MEDICATIONS:   Current Medications[1]    ALLERGIES:   Allergies[2]    PHYSICAL EXAM:   /68     Scattered varicose veins along the left LE        IMPRESSION:   Left lower extremity pain due to venous insufficiency.   Symptomatic calf varicosities.    PLAN:     We reviewed the options for management for venous insufficiency, including conservative therapy, medical-grade compression hose, sclerotherapy, stab phlebectomy, and endovenous laser ablation. The patient was again educated in the benign condition of venous disease.  Since his symptoms are not severe, he would like to continue the conservative management.  I confirmed that the plan sounds just fine and he can reach out to us if he changes his mind or his symptoms became severe.  We are not going to schedule him for another clinic visit.  But I will gladly see him in the clinic as needed.    Thank you for  allowing to participate in the care of your patients . Please do not hesitate to contact my office with any questions.      Sincerely,  Lalita Iverson MD  [facility], Division of Vascular Surgery             [1]   Current Outpatient Medications   Medication Sig Dispense Refill    tamsulosin 0.4 MG Oral Cap Take 1 capsule (0.4 mg total) by mouth in the morning. 90 capsule 0    atorvastatin 40 MG Oral Tab Take 1 tablet (40 mg total) by mouth nightly. 90 tablet 3    escitalopram 10 MG Oral Tab Take 1 tablet (10 mg total) by mouth daily. 90 tablet 3    ALPRAZolam 0.5 MG Oral Tab Take 1 tablet (0.5 mg total) by mouth daily as needed for Anxiety. 6 tablet 0    brimonidine 0.2 % Ophthalmic Solution       cetirizine 10 MG Oral Tab       aspirin 81 MG Oral Tab Take 1 tablet (81 mg total) by mouth daily.     [2] No Known Allergies

## 2025-08-07 DIAGNOSIS — F41.9 ANXIETY: ICD-10-CM

## 2025-08-08 RX ORDER — TAMSULOSIN HYDROCHLORIDE 0.4 MG/1
0.4 CAPSULE ORAL DAILY
Qty: 90 CAPSULE | Refills: 3 | Status: SHIPPED | OUTPATIENT
Start: 2025-08-08

## 2025-08-08 RX ORDER — TAMSULOSIN HYDROCHLORIDE 0.4 MG/1
0.4 CAPSULE ORAL DAILY
Qty: 90 CAPSULE | Refills: 0 | OUTPATIENT
Start: 2025-08-08

## 2025-08-08 RX ORDER — ALPRAZOLAM 0.5 MG
0.5 TABLET ORAL
Qty: 6 TABLET | Refills: 0 | Status: SHIPPED | OUTPATIENT
Start: 2025-08-08

## 2025-08-12 ENCOUNTER — OFFICE VISIT (OUTPATIENT)
Dept: SURGERY | Facility: CLINIC | Age: 70
End: 2025-08-12

## 2025-08-12 DIAGNOSIS — N30.90 RECURRENT CYSTITIS: ICD-10-CM

## 2025-08-12 DIAGNOSIS — R35.0 URINARY FREQUENCY: ICD-10-CM

## 2025-08-12 DIAGNOSIS — R30.0 DYSURIA: Primary | ICD-10-CM

## 2025-08-12 LAB
APPEARANCE: CLEAR
BILIRUBIN: NEGATIVE
GLUCOSE (URINE DIPSTICK): NEGATIVE MG/DL
KETONES (URINE DIPSTICK): NEGATIVE MG/DL
LEUKOCYTES: NEGATIVE
MULTISTIX LOT#: NORMAL NUMERIC
NITRITE, URINE: NEGATIVE
OCCULT BLOOD: NEGATIVE
PH, URINE: 5.5 (ref 4.5–8)
PROTEIN (URINE DIPSTICK): NEGATIVE MG/DL
SPECIFIC GRAVITY: 1.01 (ref 1–1.03)
URINE-COLOR: YELLOW
UROBILINOGEN,SEMI-QN: 0.2 MG/DL (ref 0–1.9)

## 2025-08-12 PROCEDURE — 99213 OFFICE O/P EST LOW 20 MIN: CPT | Performed by: PHYSICIAN ASSISTANT

## 2025-08-12 PROCEDURE — 81003 URINALYSIS AUTO W/O SCOPE: CPT | Performed by: PHYSICIAN ASSISTANT

## 2025-08-12 PROCEDURE — 51798 US URINE CAPACITY MEASURE: CPT | Performed by: PHYSICIAN ASSISTANT

## 2025-08-13 ENCOUNTER — HOSPITAL ENCOUNTER (OUTPATIENT)
Dept: ULTRASOUND IMAGING | Facility: HOSPITAL | Age: 70
Discharge: HOME | End: 2025-08-13
Attending: PHYSICIAN ASSISTANT

## 2025-08-13 DIAGNOSIS — N30.90 RECURRENT CYSTITIS: ICD-10-CM

## 2025-08-13 DIAGNOSIS — R35.0 URINARY FREQUENCY: ICD-10-CM

## 2025-08-13 PROCEDURE — 76770 US EXAM ABDO BACK WALL COMP: CPT | Performed by: PHYSICIAN ASSISTANT

## 2025-08-18 ENCOUNTER — HOSPITAL ENCOUNTER (EMERGENCY)
Facility: HOSPITAL | Age: 70
Discharge: HOME | End: 2025-08-18
Attending: EMERGENCY MEDICINE

## 2025-08-18 ENCOUNTER — APPOINTMENT (OUTPATIENT)
Dept: CT IMAGING | Facility: HOSPITAL | Age: 70
End: 2025-08-18
Attending: EMERGENCY MEDICINE

## 2025-08-18 VITALS
RESPIRATION RATE: 18 BRPM | HEART RATE: 48 BPM | DIASTOLIC BLOOD PRESSURE: 82 MMHG | TEMPERATURE: 98 F | OXYGEN SATURATION: 100 % | SYSTOLIC BLOOD PRESSURE: 150 MMHG | BODY MASS INDEX: 24.99 KG/M2 | WEIGHT: 150 LBS | HEIGHT: 65 IN

## 2025-08-18 DIAGNOSIS — R30.0 DYSURIA: Primary | ICD-10-CM

## 2025-08-18 LAB
ALBUMIN SERPL-MCNC: 4.6 G/DL (ref 3.2–4.8)
ALBUMIN/GLOB SERPL: 1.9 (ref 1–2)
ALP LIVER SERPL-CCNC: 96 U/L (ref 45–117)
ALT SERPL-CCNC: 21 U/L (ref 10–49)
ANION GAP SERPL CALC-SCNC: 9 MMOL/L (ref 0–18)
AST SERPL-CCNC: 39 U/L (ref ?–34)
BASOPHILS # BLD AUTO: 0.03 X10(3) UL (ref 0–0.2)
BASOPHILS NFR BLD AUTO: 0.6 %
BILIRUB SERPL-MCNC: 0.5 MG/DL (ref 0.2–1.1)
BILIRUB UR QL STRIP.AUTO: NEGATIVE
BUN BLD-MCNC: 19 MG/DL (ref 9–23)
CALCIUM BLD-MCNC: 9.9 MG/DL (ref 8.7–10.6)
CHLORIDE SERPL-SCNC: 105 MMOL/L (ref 98–112)
CLARITY UR REFRACT.AUTO: CLEAR
CO2 SERPL-SCNC: 27 MMOL/L (ref 21–32)
COLOR UR AUTO: COLORLESS
CREAT BLD-MCNC: 1.09 MG/DL (ref 0.7–1.3)
EGFRCR SERPLBLD CKD-EPI 2021: 73 ML/MIN/1.73M2 (ref 60–?)
EOSINOPHIL # BLD AUTO: 0.11 X10(3) UL (ref 0–0.7)
EOSINOPHIL NFR BLD AUTO: 2.3 %
ERYTHROCYTE [DISTWIDTH] IN BLOOD BY AUTOMATED COUNT: 12.9 %
GLOBULIN PLAS-MCNC: 2.4 G/DL (ref 2–3.5)
GLUCOSE BLD-MCNC: 125 MG/DL (ref 70–99)
GLUCOSE UR STRIP.AUTO-MCNC: NORMAL MG/DL
HCT VFR BLD AUTO: 39 % (ref 39–53)
HGB BLD-MCNC: 13.5 G/DL (ref 13–17.5)
IMM GRANULOCYTES # BLD AUTO: 0.01 X10(3) UL (ref 0–1)
IMM GRANULOCYTES NFR BLD: 0.2 %
KETONES UR STRIP.AUTO-MCNC: NEGATIVE MG/DL
LEUKOCYTE ESTERASE UR QL STRIP.AUTO: NEGATIVE
LIPASE SERPL-CCNC: 109 U/L (ref 12–53)
LYMPHOCYTES # BLD AUTO: 1.33 X10(3) UL (ref 1–4)
LYMPHOCYTES NFR BLD AUTO: 27.9 %
MCH RBC QN AUTO: 31 PG (ref 26–34)
MCHC RBC AUTO-ENTMCNC: 34.6 G/DL (ref 31–37)
MCV RBC AUTO: 89.4 FL (ref 80–100)
MONOCYTES # BLD AUTO: 0.47 X10(3) UL (ref 0.1–1)
MONOCYTES NFR BLD AUTO: 9.9 %
NEUTROPHILS # BLD AUTO: 2.81 X10 (3) UL (ref 1.5–7.7)
NEUTROPHILS # BLD AUTO: 2.81 X10(3) UL (ref 1.5–7.7)
NEUTROPHILS NFR BLD AUTO: 59.1 %
NITRITE UR QL STRIP.AUTO: NEGATIVE
OSMOLALITY SERPL CALC.SUM OF ELEC: 296 MOSM/KG (ref 275–295)
PH UR STRIP.AUTO: 6.5 (ref 5–8)
PLATELET # BLD AUTO: 176 10(3)UL (ref 150–450)
POTASSIUM SERPL-SCNC: 4.7 MMOL/L (ref 3.5–5.1)
PROT SERPL-MCNC: 7 G/DL (ref 5.7–8.2)
PROT UR STRIP.AUTO-MCNC: NEGATIVE MG/DL
RBC # BLD AUTO: 4.36 X10(6)UL (ref 3.8–5.8)
RBC UR QL AUTO: NEGATIVE
SODIUM SERPL-SCNC: 141 MMOL/L (ref 136–145)
SP GR UR STRIP.AUTO: 1.01 (ref 1–1.03)
UROBILINOGEN UR STRIP.AUTO-MCNC: NORMAL MG/DL
WBC # BLD AUTO: 4.8 X10(3) UL (ref 4–11)

## 2025-08-18 PROCEDURE — 85025 COMPLETE CBC W/AUTO DIFF WBC: CPT | Performed by: EMERGENCY MEDICINE

## 2025-08-18 PROCEDURE — 99284 EMERGENCY DEPT VISIT MOD MDM: CPT

## 2025-08-18 PROCEDURE — 74176 CT ABD & PELVIS W/O CONTRAST: CPT | Performed by: EMERGENCY MEDICINE

## 2025-08-18 PROCEDURE — 83690 ASSAY OF LIPASE: CPT

## 2025-08-18 PROCEDURE — 83690 ASSAY OF LIPASE: CPT | Performed by: EMERGENCY MEDICINE

## 2025-08-18 PROCEDURE — 81003 URINALYSIS AUTO W/O SCOPE: CPT | Performed by: EMERGENCY MEDICINE

## 2025-08-18 PROCEDURE — 85025 COMPLETE CBC W/AUTO DIFF WBC: CPT

## 2025-08-18 PROCEDURE — 80053 COMPREHEN METABOLIC PANEL: CPT

## 2025-08-18 PROCEDURE — 36415 COLL VENOUS BLD VENIPUNCTURE: CPT

## 2025-08-18 PROCEDURE — 80053 COMPREHEN METABOLIC PANEL: CPT | Performed by: EMERGENCY MEDICINE

## 2025-08-18 RX ORDER — PHENAZOPYRIDINE HYDROCHLORIDE 100 MG/1
100 TABLET, FILM COATED ORAL 3 TIMES DAILY PRN
Qty: 6 TABLET | Refills: 0 | Status: SHIPPED | OUTPATIENT
Start: 2025-08-18 | End: 2025-08-25

## (undated) NOTE — LETTER
09/24/18      [facility]   [address] [address]         Dear Charo Mckee:    Our office staff has made several attempts to contact you.  It is in our best judgment for your health and well-being that you contact our office regarding your

## (undated) NOTE — ED AVS SNAPSHOT
[facility] Emergency Department    [place] [address]    Phone:  [phone]    Fax:  [phone]   MRN: [id number]    Department:  [facility] Emergency Department   Date of Visit:  3/2 Click [email or web address]      Or call [phone]    If you have any problems with your follow-up, please call our  at [phone]    Si usted tiene algun problema con [place] sequimiento, por favor llame a nuestro adminstrador de casos al ([facility]        Pharmacy Address Phone Number   [address] ([address]) [address] [address].  ([address] Approved by: Augustin Velasco MD              Narrative:    PROCEDURE:  CT BRAIN OR HEAD ([address])     COMPARISON:  [facility] , MRI   MRA BRAIN W/O CONT-SET, 3/08/2010, 18:43.      INDICATIONS:  head pain or injury     TECHNIQUE:  Noncontrast CT scanning is perfor [employer] will allow you to access patient instructions from your recent visit,  view other health information, and more. To sign up or find more information, go to [email or web address] and click on the Sign Up Now link in the [employer] box.      Enter

## (undated) NOTE — LETTER
Medical Grade Compression Hose     Patient: Ge Leonard     YOB: 1955         Diagnosis: Varicose Veins with Pain- Left Leg: I83.812  Compression: 20-30mmHg- Moderate  Style: Thigh-High        Amount: X 2  HCPS: - Thigh High          Physician Signature: _____________________  Date: 5/20/2025     ESA Leslie

## (undated) NOTE — ED AVS SNAPSHOT
[facility] Emergency Department    [facility]  [address] [address]    Phone:  [phone]    Fax:  [phone]   MRN: [id number]    Department:  [facility] Emergency Department   Date of Visit:  3/2 IF THERE IS ANY CHANGE OR WORSENING OF YOUR CONDITION, CALL YOUR PRIMARY CARE PHYSICIAN AT ONCE OR RETURN IMMEDIATELY TO THE EMERGENCY DEPARTMENT.     If you have been prescribed any medication(s), please fill your prescription right away and begin taking t